# Patient Record
Sex: FEMALE | Race: WHITE | NOT HISPANIC OR LATINO | URBAN - METROPOLITAN AREA
[De-identification: names, ages, dates, MRNs, and addresses within clinical notes are randomized per-mention and may not be internally consistent; named-entity substitution may affect disease eponyms.]

---

## 2018-09-15 ENCOUNTER — EMERGENCY (EMERGENCY)
Facility: HOSPITAL | Age: 78
LOS: 1 days | Discharge: HOME | End: 2018-09-15
Admitting: EMERGENCY MEDICINE
Payer: MEDICARE

## 2018-09-15 VITALS
HEART RATE: 74 BPM | SYSTOLIC BLOOD PRESSURE: 118 MMHG | TEMPERATURE: 98 F | OXYGEN SATURATION: 96 % | RESPIRATION RATE: 20 BRPM | DIASTOLIC BLOOD PRESSURE: 68 MMHG

## 2018-09-15 DIAGNOSIS — H66.92 OTITIS MEDIA, UNSPECIFIED, LEFT EAR: ICD-10-CM

## 2018-09-15 DIAGNOSIS — H92.02 OTALGIA, LEFT EAR: ICD-10-CM

## 2018-09-15 DIAGNOSIS — H60.92 UNSPECIFIED OTITIS EXTERNA, LEFT EAR: ICD-10-CM

## 2018-09-15 DIAGNOSIS — Z79.2 LONG TERM (CURRENT) USE OF ANTIBIOTICS: ICD-10-CM

## 2018-09-15 DIAGNOSIS — Z79.891 LONG TERM (CURRENT) USE OF OPIATE ANALGESIC: ICD-10-CM

## 2018-09-15 PROCEDURE — 99283 EMERGENCY DEPT VISIT LOW MDM: CPT

## 2018-09-15 RX ORDER — CIPROFLOXACIN AND DEXAMETHASONE 3; 1 MG/ML; MG/ML
4 SUSPENSION/ DROPS AURICULAR (OTIC) ONCE
Qty: 0 | Refills: 0 | Status: COMPLETED | OUTPATIENT
Start: 2018-09-15 | End: 2018-09-15

## 2018-09-15 RX ORDER — OXYCODONE AND ACETAMINOPHEN 5; 325 MG/1; MG/1
1 TABLET ORAL ONCE
Qty: 0 | Refills: 0 | Status: DISCONTINUED | OUTPATIENT
Start: 2018-09-15 | End: 2018-09-15

## 2018-09-15 RX ADMIN — OXYCODONE AND ACETAMINOPHEN 1 TABLET(S): 5; 325 TABLET ORAL at 23:33

## 2018-09-15 RX ADMIN — Medication 1 TABLET(S): at 23:33

## 2018-09-15 RX ADMIN — CIPROFLOXACIN AND DEXAMETHASONE 4 DROP(S): 3; 1 SUSPENSION/ DROPS AURICULAR (OTIC) at 23:33

## 2018-09-15 NOTE — ED PROVIDER NOTE - OBJECTIVE STATEMENT
78 yo F with no known PMHx, presenting c/o L ear pain x 2d.  Pt reports having a few days of non productive cough, sinus pressure, and L ear pain, s/p dental work yesterday and noted worsening L ear pain with muffled hearing and intermittent sharp pain radiating to the face.  Denies trauma, tinnitus, change in gait/balance, dysequilibrium, HA, dizziness, fever, chills, FB sensation, cough, sore throat, d/c, bleeding, pruritus, N/V, SOB, CP, palpitations, focal weakness, and malaise.

## 2018-09-15 NOTE — ED PROVIDER NOTE - MEDICAL DECISION MAKING DETAILS
AFVSS at time of d/c, pt non-toxic appearing, results, ddx, and f/u plans discussed with pt at bedside, d/c'd home to f/u with ENT, strict return precautions discussed, prompt return to ER for any worsening or new sx, pt verbalized understanding.

## 2018-09-15 NOTE — ED ADULT NURSE NOTE - CHPI ED NUR SYMPTOMS NEG
no fever/no nausea/no tingling/no weakness/no decreased eating/drinking/no dizziness/no chills/no vomiting

## 2018-09-15 NOTE — ED PROVIDER NOTE - PHYSICAL EXAMINATION
Gen - WDWN, NAD, comfortable and non-toxic appearing  Skin - warm, dry, intact   HEENT - AT/NC, TM intact b/l, L ear with dull retracted TM with slight yellowish discoloration and fluids, slightly erythematous canal, no mastoid or pinna/tragus tenderness to percussion, uvula midline, o/p clear, airway patent, neck supple, no palpable nodes noted   CV - S1S2, R/R/R  Resp - CTAB, no r/r/w  GI - soft, ND, NT, no CVAT b/l   MS - w/w/p, no c/c/e  Neuro - AxOx3, ambulatory without gait disturbance

## 2020-10-21 ENCOUNTER — INPATIENT (INPATIENT)
Facility: HOSPITAL | Age: 80
LOS: 2 days | Discharge: NURSING, REHAB OR LONG-TERM CARE | DRG: 563 | End: 2020-10-24
Attending: STUDENT IN AN ORGANIZED HEALTH CARE EDUCATION/TRAINING PROGRAM | Admitting: STUDENT IN AN ORGANIZED HEALTH CARE EDUCATION/TRAINING PROGRAM
Payer: MEDICARE

## 2020-10-21 VITALS
TEMPERATURE: 98 F | HEART RATE: 85 BPM | OXYGEN SATURATION: 95 % | WEIGHT: 149.91 LBS | RESPIRATION RATE: 18 BRPM | SYSTOLIC BLOOD PRESSURE: 131 MMHG | DIASTOLIC BLOOD PRESSURE: 72 MMHG | HEIGHT: 63 IN

## 2020-10-21 DIAGNOSIS — S82.035A NONDISPLACED TRANSVERSE FRACTURE OF LEFT PATELLA, INITIAL ENCOUNTER FOR CLOSED FRACTURE: ICD-10-CM

## 2020-10-21 DIAGNOSIS — Z98.890 OTHER SPECIFIED POSTPROCEDURAL STATES: Chronic | ICD-10-CM

## 2020-10-21 DIAGNOSIS — Z90.49 ACQUIRED ABSENCE OF OTHER SPECIFIED PARTS OF DIGESTIVE TRACT: Chronic | ICD-10-CM

## 2020-10-21 DIAGNOSIS — B00.9 HERPESVIRAL INFECTION, UNSPECIFIED: ICD-10-CM

## 2020-10-21 DIAGNOSIS — L65.9 NONSCARRING HAIR LOSS, UNSPECIFIED: ICD-10-CM

## 2020-10-21 DIAGNOSIS — F33.8 OTHER RECURRENT DEPRESSIVE DISORDERS: ICD-10-CM

## 2020-10-21 DIAGNOSIS — R73.03 PREDIABETES: ICD-10-CM

## 2020-10-21 DIAGNOSIS — R63.8 OTHER SYMPTOMS AND SIGNS CONCERNING FOOD AND FLUID INTAKE: ICD-10-CM

## 2020-10-21 DIAGNOSIS — Z96.652 PRESENCE OF LEFT ARTIFICIAL KNEE JOINT: Chronic | ICD-10-CM

## 2020-10-21 DIAGNOSIS — I62.9 NONTRAUMATIC INTRACRANIAL HEMORRHAGE, UNSPECIFIED: ICD-10-CM

## 2020-10-21 DIAGNOSIS — N39.0 URINARY TRACT INFECTION, SITE NOT SPECIFIED: ICD-10-CM

## 2020-10-21 DIAGNOSIS — E55.9 VITAMIN D DEFICIENCY, UNSPECIFIED: ICD-10-CM

## 2020-10-21 DIAGNOSIS — E78.5 HYPERLIPIDEMIA, UNSPECIFIED: ICD-10-CM

## 2020-10-21 LAB
ALBUMIN SERPL ELPH-MCNC: 3.9 G/DL — SIGNIFICANT CHANGE UP (ref 3.4–5)
ALP SERPL-CCNC: 57 U/L — SIGNIFICANT CHANGE UP (ref 40–120)
ALT FLD-CCNC: 22 U/L — SIGNIFICANT CHANGE UP (ref 12–42)
ANION GAP SERPL CALC-SCNC: 10 MMOL/L — SIGNIFICANT CHANGE UP (ref 9–16)
APPEARANCE UR: SIGNIFICANT CHANGE UP
APTT BLD: 30.5 SEC — SIGNIFICANT CHANGE UP (ref 27.5–35.5)
AST SERPL-CCNC: 23 U/L — SIGNIFICANT CHANGE UP (ref 15–37)
BACTERIA # UR AUTO: PRESENT /HPF
BASOPHILS # BLD AUTO: 0.08 K/UL — SIGNIFICANT CHANGE UP (ref 0–0.2)
BASOPHILS NFR BLD AUTO: 0.4 % — SIGNIFICANT CHANGE UP (ref 0–2)
BILIRUB SERPL-MCNC: 0.5 MG/DL — SIGNIFICANT CHANGE UP (ref 0.2–1.2)
BILIRUB UR-MCNC: ABNORMAL
BUN SERPL-MCNC: 22 MG/DL — SIGNIFICANT CHANGE UP (ref 7–23)
CALCIUM SERPL-MCNC: 9.4 MG/DL — SIGNIFICANT CHANGE UP (ref 8.5–10.5)
CHLORIDE SERPL-SCNC: 104 MMOL/L — SIGNIFICANT CHANGE UP (ref 96–108)
CO2 SERPL-SCNC: 25 MMOL/L — SIGNIFICANT CHANGE UP (ref 22–31)
COLOR SPEC: YELLOW — SIGNIFICANT CHANGE UP
COMMENT - URINE: SIGNIFICANT CHANGE UP
COMMENT - URINE: SIGNIFICANT CHANGE UP
CREAT SERPL-MCNC: 0.63 MG/DL — SIGNIFICANT CHANGE UP (ref 0.5–1.3)
CRP SERPL-MCNC: 0.2 MG/DL — SIGNIFICANT CHANGE UP (ref 0–0.9)
DIFF PNL FLD: ABNORMAL
EOSINOPHIL # BLD AUTO: 0.01 K/UL — SIGNIFICANT CHANGE UP (ref 0–0.5)
EOSINOPHIL NFR BLD AUTO: 0.1 % — SIGNIFICANT CHANGE UP (ref 0–6)
EPI CELLS # UR: SIGNIFICANT CHANGE UP /HPF (ref 0–5)
GLUCOSE SERPL-MCNC: 134 MG/DL — HIGH (ref 70–99)
GLUCOSE UR QL: NEGATIVE — SIGNIFICANT CHANGE UP
HCT VFR BLD CALC: 38.3 % — SIGNIFICANT CHANGE UP (ref 34.5–45)
HGB BLD-MCNC: 12.8 G/DL — SIGNIFICANT CHANGE UP (ref 11.5–15.5)
IMM GRANULOCYTES NFR BLD AUTO: 0.5 % — SIGNIFICANT CHANGE UP (ref 0–1.5)
INR BLD: 1.09 — SIGNIFICANT CHANGE UP (ref 0.88–1.16)
KETONES UR-MCNC: >=80 MG/DL
LACTATE SERPL-SCNC: 0.8 MMOL/L — SIGNIFICANT CHANGE UP (ref 0.4–2)
LEUKOCYTE ESTERASE UR-ACNC: ABNORMAL
LYMPHOCYTES # BLD AUTO: 15.4 % — SIGNIFICANT CHANGE UP (ref 13–44)
LYMPHOCYTES # BLD AUTO: 2.88 K/UL — SIGNIFICANT CHANGE UP (ref 1–3.3)
MCHC RBC-ENTMCNC: 32.2 PG — SIGNIFICANT CHANGE UP (ref 27–34)
MCHC RBC-ENTMCNC: 33.4 GM/DL — SIGNIFICANT CHANGE UP (ref 32–36)
MCV RBC AUTO: 96.5 FL — SIGNIFICANT CHANGE UP (ref 80–100)
MONOCYTES # BLD AUTO: 1.37 K/UL — HIGH (ref 0–0.9)
MONOCYTES NFR BLD AUTO: 7.3 % — SIGNIFICANT CHANGE UP (ref 2–14)
NEUTROPHILS # BLD AUTO: 14.23 K/UL — HIGH (ref 1.8–7.4)
NEUTROPHILS NFR BLD AUTO: 76.3 % — SIGNIFICANT CHANGE UP (ref 43–77)
NITRITE UR-MCNC: NEGATIVE — SIGNIFICANT CHANGE UP
NRBC # BLD: 0 /100 WBCS — SIGNIFICANT CHANGE UP (ref 0–0)
PH UR: 5.5 — SIGNIFICANT CHANGE UP (ref 5–8)
PLATELET # BLD AUTO: 228 K/UL — SIGNIFICANT CHANGE UP (ref 150–400)
POTASSIUM SERPL-MCNC: 3.8 MMOL/L — SIGNIFICANT CHANGE UP (ref 3.5–5.3)
POTASSIUM SERPL-SCNC: 3.8 MMOL/L — SIGNIFICANT CHANGE UP (ref 3.5–5.3)
PROT SERPL-MCNC: 7.5 G/DL — SIGNIFICANT CHANGE UP (ref 6.4–8.2)
PROT UR-MCNC: NEGATIVE MG/DL — SIGNIFICANT CHANGE UP
PROTHROM AB SERPL-ACNC: 12.8 SEC — SIGNIFICANT CHANGE UP (ref 10.6–13.6)
RBC # BLD: 3.97 M/UL — SIGNIFICANT CHANGE UP (ref 3.8–5.2)
RBC # FLD: 14.7 % — HIGH (ref 10.3–14.5)
RBC CASTS # UR COMP ASSIST: > 10 /HPF
SARS-COV-2 RNA SPEC QL NAA+PROBE: SIGNIFICANT CHANGE UP
SODIUM SERPL-SCNC: 139 MMOL/L — SIGNIFICANT CHANGE UP (ref 132–145)
SP GR SPEC: >=1.03 — SIGNIFICANT CHANGE UP (ref 1–1.03)
UROBILINOGEN FLD QL: 0.2 E.U./DL — SIGNIFICANT CHANGE UP
WBC # BLD: 18.67 K/UL — HIGH (ref 3.8–10.5)
WBC # FLD AUTO: 18.67 K/UL — HIGH (ref 3.8–10.5)
WBC UR QL: ABNORMAL /HPF

## 2020-10-21 PROCEDURE — 73564 X-RAY EXAM KNEE 4 OR MORE: CPT | Mod: 26,LT

## 2020-10-21 PROCEDURE — 71045 X-RAY EXAM CHEST 1 VIEW: CPT | Mod: 26

## 2020-10-21 PROCEDURE — 99223 1ST HOSP IP/OBS HIGH 75: CPT | Mod: AI,GC

## 2020-10-21 PROCEDURE — 73700 CT LOWER EXTREMITY W/O DYE: CPT | Mod: 26,LT

## 2020-10-21 PROCEDURE — 99284 EMERGENCY DEPT VISIT MOD MDM: CPT

## 2020-10-21 RX ORDER — MORPHINE SULFATE 50 MG/1
4 CAPSULE, EXTENDED RELEASE ORAL ONCE
Refills: 0 | Status: DISCONTINUED | OUTPATIENT
Start: 2020-10-21 | End: 2020-10-21

## 2020-10-21 RX ORDER — ACYCLOVIR SODIUM 500 MG
150 VIAL (EA) INTRAVENOUS DAILY
Refills: 0 | Status: DISCONTINUED | OUTPATIENT
Start: 2020-10-21 | End: 2020-10-21

## 2020-10-21 RX ORDER — SPIRONOLACTONE 25 MG/1
25 TABLET, FILM COATED ORAL DAILY
Refills: 0 | Status: DISCONTINUED | OUTPATIENT
Start: 2020-10-21 | End: 2020-10-24

## 2020-10-21 RX ORDER — OXYCODONE AND ACETAMINOPHEN 5; 325 MG/1; MG/1
0.25 TABLET ORAL ONCE
Refills: 0 | Status: DISCONTINUED | OUTPATIENT
Start: 2020-10-21 | End: 2020-10-21

## 2020-10-21 RX ORDER — MORPHINE SULFATE 50 MG/1
2 CAPSULE, EXTENDED RELEASE ORAL ONCE
Refills: 0 | Status: DISCONTINUED | OUTPATIENT
Start: 2020-10-21 | End: 2020-10-21

## 2020-10-21 RX ORDER — OXYCODONE HYDROCHLORIDE 5 MG/1
5 TABLET ORAL EVERY 4 HOURS
Refills: 0 | Status: DISCONTINUED | OUTPATIENT
Start: 2020-10-21 | End: 2020-10-21

## 2020-10-21 RX ORDER — ONDANSETRON 8 MG/1
4 TABLET, FILM COATED ORAL EVERY 8 HOURS
Refills: 0 | Status: DISCONTINUED | OUTPATIENT
Start: 2020-10-21 | End: 2020-10-24

## 2020-10-21 RX ORDER — CYCLOBENZAPRINE HYDROCHLORIDE 10 MG/1
5 TABLET, FILM COATED ORAL THREE TIMES A DAY
Refills: 0 | Status: DISCONTINUED | OUTPATIENT
Start: 2020-10-21 | End: 2020-10-24

## 2020-10-21 RX ORDER — BUPROPION HYDROCHLORIDE 150 MG/1
150 TABLET, EXTENDED RELEASE ORAL DAILY
Refills: 0 | Status: DISCONTINUED | OUTPATIENT
Start: 2020-10-21 | End: 2020-10-24

## 2020-10-21 RX ORDER — ACETAMINOPHEN 500 MG
975 TABLET ORAL ONCE
Refills: 0 | Status: COMPLETED | OUTPATIENT
Start: 2020-10-21 | End: 2020-10-21

## 2020-10-21 RX ORDER — ACYCLOVIR SODIUM 500 MG
150 VIAL (EA) INTRAVENOUS
Qty: 0 | Refills: 0 | DISCHARGE

## 2020-10-21 RX ORDER — ATORVASTATIN CALCIUM 80 MG/1
10 TABLET, FILM COATED ORAL AT BEDTIME
Refills: 0 | Status: DISCONTINUED | OUTPATIENT
Start: 2020-10-21 | End: 2020-10-24

## 2020-10-21 RX ORDER — HYDROMORPHONE HYDROCHLORIDE 2 MG/ML
0.25 INJECTION INTRAMUSCULAR; INTRAVENOUS; SUBCUTANEOUS EVERY 4 HOURS
Refills: 0 | Status: DISCONTINUED | OUTPATIENT
Start: 2020-10-21 | End: 2020-10-24

## 2020-10-21 RX ORDER — CHOLECALCIFEROL (VITAMIN D3) 125 MCG
3 CAPSULE ORAL
Qty: 0 | Refills: 0 | DISCHARGE

## 2020-10-21 RX ORDER — ACYCLOVIR SODIUM 500 MG
400 VIAL (EA) INTRAVENOUS
Refills: 0 | Status: DISCONTINUED | OUTPATIENT
Start: 2020-10-21 | End: 2020-10-24

## 2020-10-21 RX ORDER — ACETAMINOPHEN 500 MG
650 TABLET ORAL EVERY 6 HOURS
Refills: 0 | Status: DISCONTINUED | OUTPATIENT
Start: 2020-10-21 | End: 2020-10-21

## 2020-10-21 RX ORDER — OXYCODONE HYDROCHLORIDE 5 MG/1
2.5 TABLET ORAL EVERY 4 HOURS
Refills: 0 | Status: DISCONTINUED | OUTPATIENT
Start: 2020-10-21 | End: 2020-10-24

## 2020-10-21 RX ORDER — POLYETHYLENE GLYCOL 3350 17 G/17G
17 POWDER, FOR SOLUTION ORAL DAILY
Refills: 0 | Status: DISCONTINUED | OUTPATIENT
Start: 2020-10-21 | End: 2020-10-24

## 2020-10-21 RX ORDER — METFORMIN HYDROCHLORIDE 850 MG/1
500 TABLET ORAL DAILY
Refills: 0 | Status: DISCONTINUED | OUTPATIENT
Start: 2020-10-21 | End: 2020-10-23

## 2020-10-21 RX ORDER — SODIUM CHLORIDE 9 MG/ML
1000 INJECTION INTRAMUSCULAR; INTRAVENOUS; SUBCUTANEOUS ONCE
Refills: 0 | Status: COMPLETED | OUTPATIENT
Start: 2020-10-21 | End: 2020-10-21

## 2020-10-21 RX ORDER — SENNA PLUS 8.6 MG/1
2 TABLET ORAL AT BEDTIME
Refills: 0 | Status: DISCONTINUED | OUTPATIENT
Start: 2020-10-21 | End: 2020-10-24

## 2020-10-21 RX ORDER — CHOLECALCIFEROL (VITAMIN D3) 125 MCG
3000 CAPSULE ORAL DAILY
Refills: 0 | Status: DISCONTINUED | OUTPATIENT
Start: 2020-10-21 | End: 2020-10-24

## 2020-10-21 RX ORDER — HYDROMORPHONE HYDROCHLORIDE 2 MG/ML
0.5 INJECTION INTRAMUSCULAR; INTRAVENOUS; SUBCUTANEOUS EVERY 4 HOURS
Refills: 0 | Status: DISCONTINUED | OUTPATIENT
Start: 2020-10-21 | End: 2020-10-21

## 2020-10-21 RX ORDER — ACETAMINOPHEN 500 MG
650 TABLET ORAL EVERY 8 HOURS
Refills: 0 | Status: DISCONTINUED | OUTPATIENT
Start: 2020-10-21 | End: 2020-10-24

## 2020-10-21 RX ADMIN — MORPHINE SULFATE 2 MILLIGRAM(S): 50 CAPSULE, EXTENDED RELEASE ORAL at 03:30

## 2020-10-21 RX ADMIN — MORPHINE SULFATE 2 MILLIGRAM(S): 50 CAPSULE, EXTENDED RELEASE ORAL at 06:21

## 2020-10-21 RX ADMIN — Medication 650 MILLIGRAM(S): at 22:08

## 2020-10-21 RX ADMIN — HYDROMORPHONE HYDROCHLORIDE 0.25 MILLIGRAM(S): 2 INJECTION INTRAMUSCULAR; INTRAVENOUS; SUBCUTANEOUS at 14:46

## 2020-10-21 RX ADMIN — ATORVASTATIN CALCIUM 10 MILLIGRAM(S): 80 TABLET, FILM COATED ORAL at 22:08

## 2020-10-21 RX ADMIN — MORPHINE SULFATE 2 MILLIGRAM(S): 50 CAPSULE, EXTENDED RELEASE ORAL at 03:14

## 2020-10-21 RX ADMIN — OXYCODONE AND ACETAMINOPHEN 0.25 TABLET(S): 5; 325 TABLET ORAL at 01:44

## 2020-10-21 RX ADMIN — OXYCODONE HYDROCHLORIDE 5 MILLIGRAM(S): 5 TABLET ORAL at 09:39

## 2020-10-21 RX ADMIN — HYDROMORPHONE HYDROCHLORIDE 0.5 MILLIGRAM(S): 2 INJECTION INTRAMUSCULAR; INTRAVENOUS; SUBCUTANEOUS at 10:30

## 2020-10-21 RX ADMIN — OXYCODONE HYDROCHLORIDE 5 MILLIGRAM(S): 5 TABLET ORAL at 08:58

## 2020-10-21 RX ADMIN — OXYCODONE AND ACETAMINOPHEN 0.25 TABLET(S): 5; 325 TABLET ORAL at 02:55

## 2020-10-21 RX ADMIN — Medication 650 MILLIGRAM(S): at 23:00

## 2020-10-21 RX ADMIN — MORPHINE SULFATE 4 MILLIGRAM(S): 50 CAPSULE, EXTENDED RELEASE ORAL at 06:23

## 2020-10-21 RX ADMIN — OXYCODONE AND ACETAMINOPHEN 0.25 TABLET(S): 5; 325 TABLET ORAL at 03:08

## 2020-10-21 RX ADMIN — MORPHINE SULFATE 2 MILLIGRAM(S): 50 CAPSULE, EXTENDED RELEASE ORAL at 03:45

## 2020-10-21 RX ADMIN — SENNA PLUS 2 TABLET(S): 8.6 TABLET ORAL at 22:08

## 2020-10-21 RX ADMIN — HYDROMORPHONE HYDROCHLORIDE 0.25 MILLIGRAM(S): 2 INJECTION INTRAMUSCULAR; INTRAVENOUS; SUBCUTANEOUS at 15:00

## 2020-10-21 RX ADMIN — MORPHINE SULFATE 2 MILLIGRAM(S): 50 CAPSULE, EXTENDED RELEASE ORAL at 04:36

## 2020-10-21 RX ADMIN — BUPROPION HYDROCHLORIDE 150 MILLIGRAM(S): 150 TABLET, EXTENDED RELEASE ORAL at 14:17

## 2020-10-21 RX ADMIN — CYCLOBENZAPRINE HYDROCHLORIDE 5 MILLIGRAM(S): 10 TABLET, FILM COATED ORAL at 11:55

## 2020-10-21 RX ADMIN — HYDROMORPHONE HYDROCHLORIDE 0.5 MILLIGRAM(S): 2 INJECTION INTRAMUSCULAR; INTRAVENOUS; SUBCUTANEOUS at 10:04

## 2020-10-21 RX ADMIN — OXYCODONE AND ACETAMINOPHEN 0.25 TABLET(S): 5; 325 TABLET ORAL at 01:56

## 2020-10-21 RX ADMIN — OXYCODONE AND ACETAMINOPHEN 0.25 TABLET(S): 5; 325 TABLET ORAL at 02:08

## 2020-10-21 RX ADMIN — Medication 650 MILLIGRAM(S): at 14:25

## 2020-10-21 RX ADMIN — SODIUM CHLORIDE 1000 MILLILITER(S): 9 INJECTION INTRAMUSCULAR; INTRAVENOUS; SUBCUTANEOUS at 05:53

## 2020-10-21 RX ADMIN — OXYCODONE AND ACETAMINOPHEN 0.25 TABLET(S): 5; 325 TABLET ORAL at 02:44

## 2020-10-21 RX ADMIN — Medication 650 MILLIGRAM(S): at 14:18

## 2020-10-21 RX ADMIN — ONDANSETRON 4 MILLIGRAM(S): 8 TABLET, FILM COATED ORAL at 14:25

## 2020-10-21 RX ADMIN — MORPHINE SULFATE 4 MILLIGRAM(S): 50 CAPSULE, EXTENDED RELEASE ORAL at 05:52

## 2020-10-21 RX ADMIN — Medication 400 MILLIGRAM(S): at 17:24

## 2020-10-21 RX ADMIN — Medication 975 MILLIGRAM(S): at 06:21

## 2020-10-21 NOTE — H&P ADULT - NSHPSOCIALHISTORY_GEN_ALL_CORE
Pt lives alone, but has children and significant other that live in [place]  Former smoker (quit 30 yrs ago), social EtOH, no illicit drug use
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
## 2020-10-21 NOTE — H&P ADULT - PROBLEM SELECTOR PLAN 5
F: tolerating PO, no IVF  E: replete K<4, Mg<2  N: Regular    VTE Prophylaxis: Lovenox 40mg q24h  GI: not needed  C: Full Code  D: RMF Pt reports last HA1C 5.7%. On home metformin 500mg qd  -c/w metformin 500mg qd

## 2020-10-21 NOTE — H&P ADULT - PROBLEM SELECTOR PLAN 1
CT left knee shows acute nondisplaced transverse fracture of left patella, s/p mechanical fall.   -consult ortho, f/u recs  -consult PT, f/u recs  -Tylenol 650mg PO q6h PRN for mild pain  -Oxycodone IR 5mg PO q6h PRN for moderate pain CT left knee shows acute nondisplaced transverse fracture of left patella, s/p mechanical fall.   -Ortho onboard, f/u recs  -consult PT, f/u recs  -Tylenol 650mg PO q6h PRN for mild pain  -Oxycodone IR 5mg PO q4h PRN for moderate pain  -Dilaudid 0.5mg IV q4h PRN for severe pain CT left knee shows acute nondisplaced transverse fracture of left patella, s/p mechanical fall.   -Ortho onboard, f/u recs  -consult PT, f/u recs  -Tylenol 650mg PO q6h PRN for mild pain  -Oxycodone IR 5mg PO q4h PRN for moderate pain  -Dilaudid 0.5mg IV q4h PRN for severe pain  -Miralax 17g qd and Senna 2 tabs at bedtime for bowel regimen CT left knee shows acute nondisplaced transverse fracture of left patella, s/p mechanical fall.   -Ortho onboard, f/u recs  -PT consulted, f/u recs  -Tylenol 650mg PO q6h PRN for mild pain  -Oxycodone IR 5mg PO q4h PRN for moderate pain  -Dilaudid 0.5mg IV q4h PRN for severe pain  -Miralax 17g qd and Senna 2 tabs at bedtime for bowel regimen  -Ice/Elevation CT left knee shows acute nondisplaced transverse fracture of left patella, s/p mechanical fall.   -Ortho onboard, f/u recs  -PT consulted, f/u recs  -Pain management consulted, f/u recs  -Tylenol 650mg PO q8h standing  -Oxycodone IR 5mg PO q4h PRN for moderate pain  -Dilaudid 0.5mg IV q4h PRN for severe breakthrough pain  -Flexeril IR 5mg PO TID PRN for muscle spasms in left knee  -Miralax 17g qd and Senna 2 tabs at bedtime for bowel regimen  -Ice/Elevation

## 2020-10-21 NOTE — H&P ADULT - PROBLEM SELECTOR PLAN 8
Pt reports hx of oral/buccal herpes. Not currently active. On home acyclovir 150mg qd.  -c/w acyclovir 150mg qd
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
## 2020-10-21 NOTE — H&P ADULT - NSICDXPASTSURGICALHX_GEN_ALL_CORE_FT
PAST SURGICAL HISTORY:  No significant past surgical history      PAST SURGICAL HISTORY:  History of arthroplasty of left knee     History of cholecystectomy     S/P bunionectomy     S/P skin cancer resection Merkel Cell Carcinoma, Left Elbow

## 2020-10-21 NOTE — PROGRESS NOTE ADULT - SUBJECTIVE AND OBJECTIVE BOX
Patient seen and examined while in bed. she is upset with her care saying she needs to go to the bathroom. I asked if she needed any pain control and we found that dilaudid with zofran would be a good combination for her as dilaudid has made her drowsy and caused her to have upset stomach.                          12.8   18.67 )-----------( 228      ( 21 Oct 2020 03:14 )             38.3     10-21    139  |  104  |  22  ----------------------------<  134<H>  3.8   |  25  |  0.63    Ca    9.4      21 Oct 2020 03:14    TPro  7.5  /  Alb  3.9  /  TBili  0.5  /  DBili  x   /  AST  23  /  ALT  22  /  AlkPhos  57  10-21        GENERAL: NAD, lying in bed comfortably  HEAD:  Atraumatic, Normocephalic  EYES: EOMI, conjunctiva and sclera clear  ENT: Moist mucous membranes  NECK: Supple, No JVD  CHEST/LUNG: Clear to auscultation bilaterally; No rales, rhonchi, wheezing, or rubs. Unlabored respirations  HEART: Regular rate and rhythm; No murmurs, rubs, or gallops  ABDOMEN: BSx4; Soft, nontender, nondistended  EXTREMITIES:  2+ Peripheral Pulses, brisk capillary refill. bilateral knee ecchymosis and diffuse swelling of left knee with leg out of immobilization cast   NERVOUS SYSTEM:  A&Ox3, no focal deficits   SKIN: No rashes or lesions
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
## 2020-10-21 NOTE — H&P ADULT - PROBLEM SELECTOR PLAN 4
Pt reports hx of seasonal affective disorder. On home Wellbutrin XL 150mg qd.   -c/w Wellbutrin XL 150mg PO qd
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
## 2020-10-21 NOTE — ED PROVIDER NOTE - MUSCULOSKELETAL, MLM
Spine appears normal, + left knee significantly swollen and tender over the patella, no tenderness to the medial or lateral joint line, unable to extend left knee against gravity but able to flex it to 120 degrees comfortably. Spine appears normal, 2+ dp/pt pulses equal bilat, compartments soft, + left knee significantly swollen and tender over the patella, no tenderness to the medial or lateral joint line, unable to extend left knee against gravity but able to flex it to 120 degrees comfortably.
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
## 2020-10-21 NOTE — ED ADULT NURSE REASSESSMENT NOTE - NS ED NURSE REASSESS COMMENT FT1
Report received from KEVIN Campbell for continuity of care, pain managed, safety and comfort maintained. Will continue to monitor.
Pt laying in bed, continue to c/o L knee pain. Administered additional 0.25 tablet of Percocet. CT scan of knee pending. Will continue to monitor. Side rails up, safety is maintained.

## 2020-10-21 NOTE — H&P ADULT - NSHPREVIEWOFSYSTEMS_GEN_ALL_CORE
Constitutional: No fevers or chills  HEENT: No visual changes;  No vertigo or throat pain. No neck pain or stiffness  Cardio: No chest pain or palpitations  Resp: No cough, wheezing, hemoptysis, SOB  GI: No abdominal or epigastric pain. No nausea, vomiting, or hematemesis; No diarrhea or constipation. No melena or hematochezia.  : No dysuria, frequency or hematuria  Neuro: No numbness or weakness  Skin: No itching, rashes

## 2020-10-21 NOTE — ED ADULT NURSE NOTE - NSIMPLEMENTINTERV_GEN_ALL_ED
Implemented All Fall Risk Interventions:  [place] to call system. Call bell, personal items and telephone within reach. Instruct patient to call for assistance. Room bathroom lighting operational. Non-slip footwear when patient is off stretcher. Physically safe environment: no spills, clutter or unnecessary equipment. Stretcher in lowest position, wheels locked, appropriate side rails in place. Provide visual cue, wrist band, yellow gown, etc. Monitor gait and stability. Monitor for mental status changes and reorient to person, place, and time. Review medications for side effects contributing to fall risk. Reinforce activity limits and safety measures with patient and family.
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
## 2020-10-21 NOTE — H&P ADULT - NSHPLABSRESULTS_GEN_ALL_CORE
LABS:                         12.8   18.67 )-----------( 228      ( 21 Oct 2020 03:14 )             38.3     10-21    139  |  104  |  22  ----------------------------<  134<H>  3.8   |  25  |  0.63    Ca    9.4      21 Oct 2020 03:14    TPro  7.5  /  Alb  3.9  /  TBili  0.5  /  DBili  x   /  AST  23  /  ALT  22  /  AlkPhos  57  10-21    PT/INR - ( 21 Oct 2020 03:14 )   PT: 12.8 sec;   INR: 1.09          PTT - ( 21 Oct 2020 03:14 )  PTT:30.5 sec  Urinalysis Basic - ( 21 Oct 2020 05:29 )    Color: Yellow / Appearance: SL CLOUDY / SG: >=1.030 / pH: x  Gluc: x / Ketone: >=80 mg/dL  / Bili: Small / Urobili: 0.2 E.U./dL   Blood: x / Protein: NEGATIVE mg/dL / Nitrite: NEGATIVE   Leuk Esterase: Moderate / RBC: > 10 /HPF / WBC Many /HPF   Sq Epi: x / Non Sq Epi: 0-5 /HPF / Bacteria: Present /HPF

## 2020-10-21 NOTE — H&P ADULT - ASSESSMENT
80 yo F w/ a PMHx of HLD, seasonal affective disorder, ICH, pre-DM, and on spironolactone for hair growth, p/w left knee pain after a mechanical fall. Acute nondisplaced transverse fracture of left patella on CT knee. Admitted to [facility] for further management of care.
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
## 2020-10-21 NOTE — ED PROVIDER NOTE - PROGRESS NOTE DETAILS
discussed case with Dr. Oden. unable to get ortho. CTC will continue to attempt ortho consult and will re-eval in 30 minutes. no call back from ortho. called CTC. spoke with Dr. Elizondo who reviewed the imaging and confirms that this is not operative. requests admission to medicine with ortho consult.

## 2020-10-21 NOTE — H&P ADULT - PROBLEM SELECTOR PLAN 9
F: tolerating PO, no IVF  E: replete K<4, Mg<2  N: Regular, Kosher    VTE Prophylaxis: Lovenox 40mg q24h indicated, but holding due to patient refusing pharmacologic DVT prophylaxis because of PMHx of ICH.   GI: not needed  C: Full Code  D: RMF UA positive for UTI. Pt denies suprapubic tenderness, dysuria, urinary frequency.   -No acute intervention indicated at this time as pt asymptomatic

## 2020-10-21 NOTE — ED PROVIDER NOTE - PMH
Hyperlipidemia, unspecified hyperlipidemia type    No pertinent past medical history    Seasonal affective disorder

## 2020-10-21 NOTE — ED PROVIDER NOTE - CARE PLAN
Principal Discharge DX:	Closed nondisplaced transverse fracture of left patella, initial encounter

## 2020-10-21 NOTE — H&P ADULT - NSHPPHYSICALEXAM_GEN_ALL_CORE
Gen: NAD, laying in bed, well appearing, alert, interactive  HEENT: PERRL, anicteric sclera, no JVD, no thyromegaly  Cardio: +S1/S2, RRR, no murmurs  Resp: CTA b/l, no w/r/r  GI: +BS x4, NT/ND  Ext: Left knee swollen and tender to palpation over patella. no tenderness to medial or lateral join line. unable to extend alen against gravity but able to flex to 120 degrees comfortably. no peripheral edema  Vasc: 3+ peripheral pulses  Skin: warm, dry, and intact. no rashes, wounds or scars  Neuro: AAOx3, CN II-XII intact, no focal deficits Gen: NAD, laying in bed, well appearing, alert, interactive  HEENT: PERRL, anicteric sclera, no JVD, no thyromegaly  Cardio: +S1/S2, RRR, no murmurs  Resp: CTA b/l, no w/r/r  GI: +BS x4, NT/ND  Ext: moderate ecchymosis over anterior left knee, skin intact, moderate/large effusion noted. Unable to flex/extend knee passively/actively 2/2 pain; difficult to assess integrity of extensor mechanism. no peripheral edema  Vasc: 3+ peripheral pulses  Skin: warm, dry, and intact. no rashes, wounds or scars  Neuro: AAOx3, CN II-XII intact, no focal deficits. Sensation intact b/l LE

## 2020-10-21 NOTE — H&P ADULT - PROBLEM SELECTOR PLAN 10
F: tolerating PO, no IVF  E: replete K<4, Mg<2  N: Regular, Kosher    VTE Prophylaxis: Lovenox 40mg q24h indicated, but holding due to patient refusing pharmacologic DVT prophylaxis because of PMHx of ICH.   GI: not needed  C: Full Code  D: RMF

## 2020-10-21 NOTE — H&P ADULT - PROBLEM SELECTOR PLAN 3
Pt reports hx of HLD. On home pravastatin 40mg qd at bedtime.   -atorvastatin 10mg qd at bedtime (therapeutic interchange)

## 2020-10-21 NOTE — ED PROVIDER NOTE - SKIN, MLM
Skin normal color for race, warm, dry and intact. No evidence of rash. healing bruise to right forearm.
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
## 2020-10-21 NOTE — ED PROVIDER NOTE - ATTENDING CONTRIBUTION TO CARE
78 yo female s/p mechanical fall sustaining injury to left knee, inability to ambulate secondary to left knee pain    exam: TTP left patella, mild effusion, no tense hemarthrosis, distal NVI.    xray: + patellar fx    plan: knee immob/crutches, pt unable to ambulate on crutches, will discuss with ortho. Will need admission for PT/SS, possible STR.
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
## 2020-10-22 DIAGNOSIS — Z86.79 PERSONAL HISTORY OF OTHER DISEASES OF THE CIRCULATORY SYSTEM: ICD-10-CM

## 2020-10-22 LAB
ANION GAP SERPL CALC-SCNC: 13 MMOL/L — SIGNIFICANT CHANGE UP (ref 5–17)
BUN SERPL-MCNC: 20 MG/DL — SIGNIFICANT CHANGE UP (ref 7–23)
CALCIUM SERPL-MCNC: 9 MG/DL — SIGNIFICANT CHANGE UP (ref 8.4–10.5)
CHLORIDE SERPL-SCNC: 100 MMOL/L — SIGNIFICANT CHANGE UP (ref 96–108)
CO2 SERPL-SCNC: 21 MMOL/L — LOW (ref 22–31)
CREAT SERPL-MCNC: 0.62 MG/DL — SIGNIFICANT CHANGE UP (ref 0.5–1.3)
CULTURE RESULTS: SIGNIFICANT CHANGE UP
GLUCOSE SERPL-MCNC: 152 MG/DL — HIGH (ref 70–99)
HCT VFR BLD CALC: 37.4 % — SIGNIFICANT CHANGE UP (ref 34.5–45)
HGB BLD-MCNC: 12.6 G/DL — SIGNIFICANT CHANGE UP (ref 11.5–15.5)
MAGNESIUM SERPL-MCNC: 1.9 MG/DL — SIGNIFICANT CHANGE UP (ref 1.6–2.6)
MCHC RBC-ENTMCNC: 32 PG — SIGNIFICANT CHANGE UP (ref 27–34)
MCHC RBC-ENTMCNC: 33.7 GM/DL — SIGNIFICANT CHANGE UP (ref 32–36)
MCV RBC AUTO: 94.9 FL — SIGNIFICANT CHANGE UP (ref 80–100)
NRBC # BLD: 0 /100 WBCS — SIGNIFICANT CHANGE UP (ref 0–0)
PHOSPHATE SERPL-MCNC: 2.2 MG/DL — LOW (ref 2.5–4.5)
PLATELET # BLD AUTO: 210 K/UL — SIGNIFICANT CHANGE UP (ref 150–400)
POTASSIUM SERPL-MCNC: 3.8 MMOL/L — SIGNIFICANT CHANGE UP (ref 3.5–5.3)
POTASSIUM SERPL-SCNC: 3.8 MMOL/L — SIGNIFICANT CHANGE UP (ref 3.5–5.3)
RBC # BLD: 3.94 M/UL — SIGNIFICANT CHANGE UP (ref 3.8–5.2)
RBC # FLD: 14.6 % — HIGH (ref 10.3–14.5)
SODIUM SERPL-SCNC: 134 MMOL/L — LOW (ref 135–145)
SPECIMEN SOURCE: SIGNIFICANT CHANGE UP
WBC # BLD: 23.61 K/UL — HIGH (ref 3.8–10.5)
WBC # FLD AUTO: 23.61 K/UL — HIGH (ref 3.8–10.5)

## 2020-10-22 PROCEDURE — 99233 SBSQ HOSP IP/OBS HIGH 50: CPT | Mod: GC

## 2020-10-22 RX ORDER — METFORMIN HYDROCHLORIDE 850 MG/1
1 TABLET ORAL
Qty: 0 | Refills: 0 | DISCHARGE

## 2020-10-22 RX ORDER — CHOLECALCIFEROL (VITAMIN D3) 125 MCG
3 CAPSULE ORAL
Qty: 0 | Refills: 0 | DISCHARGE

## 2020-10-22 RX ORDER — ACETAMINOPHEN 500 MG
2 TABLET ORAL
Qty: 0 | Refills: 0 | DISCHARGE
Start: 2020-10-22

## 2020-10-22 RX ORDER — OXYCODONE HYDROCHLORIDE 5 MG/1
2.5 TABLET ORAL
Qty: 0 | Refills: 0 | DISCHARGE
Start: 2020-10-22

## 2020-10-22 RX ORDER — BUPROPION HYDROCHLORIDE 150 MG/1
1 TABLET, EXTENDED RELEASE ORAL
Qty: 0 | Refills: 0 | DISCHARGE

## 2020-10-22 RX ORDER — SPIRONOLACTONE 25 MG/1
1 TABLET, FILM COATED ORAL
Qty: 0 | Refills: 0 | DISCHARGE

## 2020-10-22 RX ORDER — ACYCLOVIR SODIUM 500 MG
400 VIAL (EA) INTRAVENOUS
Qty: 0 | Refills: 0 | DISCHARGE

## 2020-10-22 RX ADMIN — POLYETHYLENE GLYCOL 3350 17 GRAM(S): 17 POWDER, FOR SOLUTION ORAL at 12:04

## 2020-10-22 RX ADMIN — CYCLOBENZAPRINE HYDROCHLORIDE 5 MILLIGRAM(S): 10 TABLET, FILM COATED ORAL at 00:56

## 2020-10-22 RX ADMIN — OXYCODONE HYDROCHLORIDE 2.5 MILLIGRAM(S): 5 TABLET ORAL at 01:50

## 2020-10-22 RX ADMIN — HYDROMORPHONE HYDROCHLORIDE 0.25 MILLIGRAM(S): 2 INJECTION INTRAMUSCULAR; INTRAVENOUS; SUBCUTANEOUS at 04:45

## 2020-10-22 RX ADMIN — Medication 650 MILLIGRAM(S): at 16:07

## 2020-10-22 RX ADMIN — SPIRONOLACTONE 25 MILLIGRAM(S): 25 TABLET, FILM COATED ORAL at 06:54

## 2020-10-22 RX ADMIN — Medication 3000 UNIT(S): at 12:04

## 2020-10-22 RX ADMIN — BUPROPION HYDROCHLORIDE 150 MILLIGRAM(S): 150 TABLET, EXTENDED RELEASE ORAL at 12:04

## 2020-10-22 RX ADMIN — SENNA PLUS 2 TABLET(S): 8.6 TABLET ORAL at 22:23

## 2020-10-22 RX ADMIN — Medication 650 MILLIGRAM(S): at 06:53

## 2020-10-22 RX ADMIN — Medication 650 MILLIGRAM(S): at 07:50

## 2020-10-22 RX ADMIN — ATORVASTATIN CALCIUM 10 MILLIGRAM(S): 80 TABLET, FILM COATED ORAL at 22:23

## 2020-10-22 RX ADMIN — Medication 400 MILLIGRAM(S): at 06:54

## 2020-10-22 RX ADMIN — HYDROMORPHONE HYDROCHLORIDE 0.25 MILLIGRAM(S): 2 INJECTION INTRAMUSCULAR; INTRAVENOUS; SUBCUTANEOUS at 04:27

## 2020-10-22 RX ADMIN — Medication 400 MILLIGRAM(S): at 17:28

## 2020-10-22 RX ADMIN — Medication 650 MILLIGRAM(S): at 17:27

## 2020-10-22 RX ADMIN — OXYCODONE HYDROCHLORIDE 2.5 MILLIGRAM(S): 5 TABLET ORAL at 00:56

## 2020-10-22 RX ADMIN — METFORMIN HYDROCHLORIDE 500 MILLIGRAM(S): 850 TABLET ORAL at 12:04

## 2020-10-22 RX ADMIN — Medication 650 MILLIGRAM(S): at 22:22

## 2020-10-22 RX ADMIN — Medication 650 MILLIGRAM(S): at 23:20

## 2020-10-22 NOTE — PHYSICAL THERAPY INITIAL EVALUATION ADULT - CRITERIA FOR SKILLED THERAPEUTIC INTERVENTIONS
therapy frequency/anticipated discharge recommendation/functional limitations in following categories/impairments found/rehab potential

## 2020-10-22 NOTE — PHYSICAL THERAPY INITIAL EVALUATION ADULT - BED MOBILITY LIMITATIONS, REHAB EVAL
Normal vision: sees adequately in most situations; can see medication labels, newsprint decreased ability to use legs for bridging/pushing

## 2020-10-22 NOTE — PHYSICAL THERAPY INITIAL EVALUATION ADULT - RANGE OF MOTION EXAMINATION, REHAB EVAL
Right LE ROM was WFL (within functional limits)/LLE in knee immobilizer/deficits as listed below/bilateral upper extremity ROM was WNL (within normal limits)

## 2020-10-22 NOTE — PROGRESS NOTE ADULT - SUBJECTIVE AND OBJECTIVE BOX
Patient seen and examined while in bed. she says she is feeling good overall, but wanted us to get her a head MRI before anticoagulation. After some discussion we came to the fact that noo anticoagulation would be needed and she would need sars. She is ok with sars after we talked. She denied headache, chest pain, sob, abdominal pain. Her pain is better controlled.                                     12.6   23.61 )-----------( 210      ( 22 Oct 2020 11:04 )             37.4     10-22    134<L>  |  100  |  20  ----------------------------<  152<H>  3.8   |  21<L>  |  0.62    Ca    9.0      22 Oct 2020 11:04  Phos  2.2     10-22  Mg     1.9     10-22          GENERAL: NAD, lying in bed comfortably  HEAD:  Atraumatic, Normocephalic  EYES: EOMI, conjunctiva and sclera clear  ENT: Moist mucous membranes  NECK: Supple, No JVD  CHEST/LUNG: Clear to auscultation bilaterally; No rales, rhonchi, wheezing, or rubs. Unlabored respirations  HEART: Regular rate and rhythm; No murmurs, rubs, or gallops  ABDOMEN: BSx4; Soft, nontender, nondistended  EXTREMITIES:  2+ Peripheral Pulses, brisk capillary refill. bilateral knee ecchymosis and diffuse swelling of left knee with leg out of immobilization cast   NERVOUS SYSTEM:  A&Ox3, no focal deficits   SKIN: No rashes or lesions

## 2020-10-22 NOTE — PROGRESS NOTE ADULT - SUBJECTIVE AND OBJECTIVE BOX
Pain Management Progress Note - [facility] [phone]      HPI: Patient seen and examined today. As per H and P, 80 yo F w/ a PMHx of ICH, merkel cell carcinoma (s/p skin resection), HLD, seasonal affective disorder, pre-DM, and on spironolactone for hair growth, p/w left knee pain after a mechanical fall. Patient reports LLE pain worse with activity and movement, pain controlled with current pain medication regimen. Patient Axox3, denies any other secondary side effects from opiate use. Reviewed pain medication regimen with patient at bedside. Patient LLE in an immobilizer patient reports sensation to her toes, patient able to wiggle her toes.       Pertinent PMH: Pain at: ___Back ___Neck___Knee ___Hip ___Shoulder ___ Opioid tolerance    Pain is _x__ sharp ____dull ___burning __x_achy ___ Intensity: ____ mild __x__mod ____severe     Location __x___surgical site _____cervical _____lumbar ____abd _____upper ext___x_Left lower ext    Worse with _x___activity _x___movement _____physical therapy___ Rest    Improved with _x___medication __x__rest ____physical therapy      oxyCODONE    IR  HYDROmorphone  Injectable  ondansetron Injectable  acetaminophen   Tablet ..  cyclobenzaprine  acyclovir   Oral Tab/Cap  senna  polyethylene glycol 3350  cholecalciferol  metFORMIN  atorvastatin  HYDROmorphone  Injectable  oxyCODONE    IR  buPROPion XL .  acyclovir    Suspension  spironolactone  oxyCODONE    IR  acetaminophen   Tablet ..  acetaminophen   Tablet ..  morphine  - Injectable  sodium chloride 0.9% Bolus  morphine  - Injectable  morphine  - Injectable  morphine  - Injectable  oxycodone    5 mG/acetaminophen 325 mG  oxycodone    5 mG/acetaminophen 325 mG  oxycodone    5 mG/acetaminophen 325 mG      ROS: Const:  _-__febrile   Eyes:___ENT:___CV: __-_chest pain  Resp: __-__sob  GI:__-_nausea -___vomiting __-__abd pain ___npo ___clears _x__full diet __bm  :___ Musk: _x__pain ___spasm  Skin:___ Neuro:  _-__kczqinch_-__pvicyvwly__-__ numbness _x__weakness _x__paresthesia  Psych:_-__anxiety  Endo:___ Heme:___Allergy:___      10-22 @ 11:0486 mL/min/1.73M2      Hemoglobin: 12.6 g/dL (10-22 @ 11:04)  Hemoglobin: 12.8 g/dL (10-21 @ 03:14)        T(C): 36.7 (10-22-20 @ 10:11), Max: 36.7 (10-22-20 @ 10:11)  HR: 88 (10-22-20 @ 10:11) (85 - 94)  BP: 142/66 (10-22-20 @ 10:11) (104/- - 142/66)  RR: 18 (10-22-20 @ 10:11) (16 - 18)  SpO2: 95% (10-22-20 @ 10:11) (93% - 95%)  Wt(kg): --      PHYSICAL EXAM:  Gen Appearance: _x__no acute distress __x_appropriate       Neuro: _x__SILT feet____ EOM Intact Psych: AAOX_3_, _x__mood/affect appropriate        Eyes: _x__conjunctiva WNL  __x___ Pupils equal and round        ENT: _x__ears and nose atraumatic__x_ Hearing grossly intact        Neck: x___trachea midline, no visible masses ___thyroid without palpable mass    Resp: _x__Nml WOB____No tactile fremitus ___clear to auscultation    Cardio: __x_extremities free from edema __x__pedal pulses palpable    GI/Abdomen: _x__soft __x___ Nontender__x____Nondistended_____HSM    Lymphatic: ___no palpable nodes in neck  ___no palpable nodes calves and feet    Skin/Wound: ___Incision, ___Dressing c/d/i,   ____surrounding tissues soft,  ___drain/chest tube present____    Muscular: EHL _4__/5  Gastrocnemius_4__/5    ___absent clubbing/cyanosis           ASSESSMENT: Patient  H and P, 80 yo F w/ a PMHx of ICH, merkel cell carcinoma (s/p skin resection), HLD, seasonal affective disorder, pre-DM, and on spironolactone for hair growth, p/w left knee pain after a mechanical fall, pain controlled with current pain medication regimen      Recommended Treatment PLAN:  1. Dilaudid 1-2mg PO Q4h prn moderate to severe pain  2. Dilaudid 0.25mg Q4h IVP prn breakthrough pain  3. Flexeril 5mg PO Q8h prn muscle spasms  4. tylenol 975mg PO Q8h   Plan discussed with Dr. Vaz

## 2020-10-22 NOTE — DISCHARGE NOTE PROVIDER - NSDCMRMEDTOKEN_GEN_ALL_CORE_FT
acyclovir: 400 milligram(s) orally 2 times a day  buPROPion 150 mg/24 hours (XL) oral tablet, extended release: 1 tab(s) orally every 24 hours  metFORMIN 500 mg oral tablet: 1 tab(s) orally 2 times a day  pravastatin 40 mg oral tablet: 1 tab(s) orally once a day  spironolactone 25 mg oral tablet: 1 tab(s) orally once a day  Vitamin D3 1000 intl units (25 mcg) oral capsule: 3 cap(s) orally once a day   acetaminophen 325 mg oral tablet: 2 tab(s) orally every 8 hours  acyclovir: 400 milligram(s) orally 2 times a day  buPROPion 150 mg/24 hours (XL) oral tablet, extended release: 1 tab(s) orally every 24 hours  metFORMIN 500 mg oral tablet: 1 tab(s) orally 2 times a day  oxyCODONE: 2.5 milligram(s) orally every 4 minutes, As Needed for pain  pravastatin 40 mg oral tablet: 1 tab(s) orally once a day  spironolactone 25 mg oral tablet: 1 tab(s) orally once a day  Vitamin D3 1000 intl units (25 mcg) oral capsule: 3 cap(s) orally once a day

## 2020-10-22 NOTE — CONSULT NOTE ADULT - ASSESSMENT
per Internal Medicine    78 yo F w/ a PMHx of HLD, seasonal affective disorder, ICH, pre-DM, and on spironolactone for hair growth, p/w left knee pain after a mechanical fall. Acute nondisplaced transverse fracture of left patella on CT knee. Admitted to [facility] for further management of care.    Problem/Plan - 1:  ·  Problem: Closed nondisplaced transverse fracture of left patella, initial encounter.  Plan: CT left knee shows acute nondisplaced transverse fracture of left patella, s/p mechanical fall.   -Ortho onboard, f/u recs  -PT consulted, f/u recs  -Pain management consulted, f/u recs  -Tylenol 650mg PO q8h standing  -Oxycodone IR 5mg PO q4h PRN for moderate pain  -Dilaudid 0.5mg IV q4h PRN for severe breakthrough pain  -Flexeril IR 5mg PO TID PRN for muscle spasms in left knee  -Miralax 17g qd and Senna 2 tabs at bedtime for bowel regimen  -Ice/Elevation.     Problem/Plan - 2:  ·  Problem: Intracranial hemorrhage.  Plan: Pt reports ICH 3-4 years ago. Mild speech irregularity. Pt refusing pharmacologic DVT prophylaxis and NSAID use for concern of rebleed. Pt reports her internist (Dr. Mayco Harper) requests MRI head prior to AC use to ensure no bleed.  -Pharmacologic DVT prophylaxis indicated, but pt refusing at this time because of PMHx of ICH  -contact pt's internist for collateral.     Problem/Plan - 3:  ·  Problem: Hyperlipidemia, unspecified hyperlipidemia type.  Plan: Pt reports hx of HLD. On home pravastatin 40mg qd at bedtime.   -atorvastatin 10mg qd at bedtime (therapeutic interchange).     Problem/Plan - 4:  ·  Problem: Seasonal affective disorder.  Plan: Pt reports hx of seasonal affective disorder. On home Wellbutrin XL 150mg qd.   -c/w Wellbutrin XL 150mg PO qd.     Problem/Plan - 5:  ·  Problem: Pre-diabetes.  Plan: Pt reports last HA1C 5.7%. On home metformin 500mg qd  -c/w metformin 500mg qd.     Problem/Plan - 6:  Problem: Hair loss. Plan: Pt taking home spironolactone 25mg qd for hair growth.  -c/w spironolactone 25mg qd.    Problem/Plan - 7:  ·  Problem: Vitamin D deficiency.  Plan: Pt reports hx of Vit D deficiency. On home Vit D3 3000 units qd  -c/w Vit D3 3000 units qd.     Problem/Plan - 8:  ·  Problem: Oral herpes simplex, not currently active.  Plan: Pt reports hx of oral/buccal herpes. Not currently active. On home acyclovir 150mg qd.  -c/w acyclovir 150mg qd.     Problem/Plan - 9:  ·  Problem: UTI (urinary tract infection).  Plan: UA positive for UTI. Pt denies suprapubic tenderness, dysuria, urinary frequency.   -No acute intervention indicated at this time as pt asymptomatic.     Problem/Plan - 10:  Problem: Nutrition, metabolism, and development symptoms. Plan; F: tolerating PO, no IVF  E: replete K<4, Mg<2  N: Regular, Kosher    VTE Prophylaxis: Lovenox 40mg q24h indicated, but holding due to patient refusing pharmacologic DVT prophylaxis because of PMHx of ICH.   GI: not needed  C: Full Code  D: RMF.

## 2020-10-22 NOTE — DISCHARGE NOTE PROVIDER - HOSPITAL COURSE
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 Found to have left knee patellar fracture. Ortho recs non-operative management. Patient has remote history of spontaneous ICH so decision to use AC was discussed with the patient. She then refused AC without a head MRI. 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 Found to have left knee patellar fracture. Ortho recs non-operative management. Patient has remote history of spontaneous ICH so decision to use AC was discussed with the patient, ultimately it was decided that _____. 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 Found to have left knee patellar fracture. Ortho recs non-operative management. Patient has remote history of spontaneous ICH so decision to use AC was discussed with the patient, ultimately it was decided that patient does not need AC and her remote history of a brain bleed is still a realtive contraindication. 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 Found to have left knee patellar fracture. Ortho recs non-operative management. Patient has remote history of spontaneous ICH so decision to use AC was discussed with the patient, ultimately it was decided that patient does not need AC and her remote history of a brain bleed is still a realtive contraindication. and patient should be discarged to [facility]    Problem/Plan:  Patellar fracture: Non-operative management c/w pain control  Seasonal affective disorder: Wellbutrin  Hair loss: spironolactone  Prediabetic: Metformin  Constipation: cholecaliferol, senna    New meds:  pain management meds    New images to follow-up:  None

## 2020-10-22 NOTE — DISCHARGE NOTE PROVIDER - NSDCFUADDAPPT_GEN_ALL_CORE_FT
You should follow-up with the orthopedist Dr. Clark next week Please follow up with your Orthopaedic Surgery Provider, Dr. Andrea Clark at [address], [address], [place] [address]. on 10/29/2020 at 1:00pm.    Please call Dr. Clark prior to appointment confirm your primary insurance.    Appointment was scheduled by Ms. YEIMI Mcmullen, Referral Coordinator.

## 2020-10-22 NOTE — PROGRESS NOTE ADULT - ASSESSMENT
78 yo F w/ a PMHx of HLD, seasonal affective disorder, ICH, pre-DM, and on spironolactone for hair growth, p/w left knee pain after a mechanical fall. Acute nondisplaced transverse fracture of left patella on CT knee. Admitted to [facility] for further management of care.     Problem/Plan - 1: Closed nondisplaced transverse fracture of left patella, initial encounter  -s/p mechanical fall.   -Ortho onboard, f/u recs  -PT consulted, f/u recs  -Pain management consulted, f/u recs    Problem/Plan - 2: Pain managment  -Tylenol 650mg PO q8h standing  -Oxycodone IR 2.5mg PO q4h PRN for moderate pain  -Dilaudid 0.25mg IV q4h PRN for severe breakthrough pain  -Flexeril IR 5mg PO TID PRN for muscle spasms in left knee  -Ice/Elevation.      Problem/Plan - 3: History of Intracranial hemorrhage  - Pt reports ICH 3-4 years ago  - Pt refusing pharmacologic DVT prophylaxis and NSAID use for concern of rebleed  - Pharmacologic DVT prophylaxis indicated, but pt refusing at this time because of PMHx of ICH  - contact pt's internist for collateral.      Problem/Plan - 3: Hyperlipidemia: home pravastatin 40mg qd at bedtime.   -atorvastatin 10mg qd at bedtime (therapeutic interchange).      Problem/Plan - 4: Seasonal affective disorder  -c/w home Wellbutrin XL 150mg PO qd.      Problem/Plan - 5: Pre-diabetes.  Plan: Pt reports last HA1C 5.7%. On home metformin 500mg qd  -c/w metformin 500mg qd.      Problem/Plan - 6: Hair loss  -c/w home spironolactone 25mg qd.     Problem/Plan - 7: Vitamin D deficiency  -c/w home Vit D3 3000 units qd.      Problem/Plan - 8: Oral herpes simplex, not currently active  -c/w home acyclovir 150mg qd.      Problem/Plan - 9: UTI (urinary tract infection): asymptomatic  -UA positive for UTI. Pt denies suprapubic tenderness, dysuria, urinary frequency so will not treat     Problem/Plan - 10: constipation  -Miralax 17g qd and Senna 2 tabs at bedtime for bowel regimen     Problem/Plan - 11:  Problem: Nutrition, metabolism, and development symptoms. Plan; F: tolerating PO, no IVF  E: replete K<4, Mg<2  N: Regular, Kosher    VTE Prophylaxis: Lovenox 40mg q24h indicated, but holding due to patient refusing pharmacologic DVT prophylaxis because of PMHx of ICH.   GI: not needed  C: Full Code  D: RMF.   78 yo F w/ a PMHx of HLD, seasonal affective disorder, ICH, pre-DM, and on spironolactone for hair growth, p/w left knee pain after a mechanical fall. Acute nondisplaced transverse fracture of left patella on CT knee. Admitted to [facility] for further management of care.     Problem/Plan - 1: Closed nondisplaced transverse fracture of left patella, initial encounter  -s/p mechanical fall.   -Ortho onboard, f/u recs  -PT consulted, f/u recs  -Pain management consulted, f/u recs    Problem/Plan - 2: Pain managment  -Tylenol 650mg PO q8h standing  -Oxycodone IR 2.5mg PO q4h PRN for moderate pain  -Dilaudid 0.25mg IV q4h PRN for severe breakthrough pain  -Flexeril IR 5mg PO TID PRN for muscle spasms in left knee  -Ice/Elevation.      Problem/Plan - 3: History of Intracranial hemorrhage  - Pt reports ICH 3-4 years ago  - Pt refusing pharmacologic DVT prophylaxis and NSAID use for concern of rebleed  - Pharmacologic DVT prophylaxis not indicated right now given type of injury and previous brain bleed  - contact pt's internist for collateral.      Problem/Plan - 3: Hyperlipidemia: home pravastatin 40mg qd at bedtime.   -atorvastatin 10mg qd at bedtime (therapeutic interchange).      Problem/Plan - 4: Seasonal affective disorder  -c/w home Wellbutrin XL 150mg PO qd.      Problem/Plan - 5: Pre-diabetes.  Plan: Pt reports last HA1C 5.7%. On home metformin 500mg qd  -c/w metformin 500mg qd.      Problem/Plan - 6: Hair loss  -c/w home spironolactone 25mg qd.     Problem/Plan - 7: Vitamin D deficiency  -c/w home Vit D3 3000 units qd.      Problem/Plan - 8: Oral herpes simplex, not currently active  -c/w home acyclovir 150mg qd.      Problem/Plan - 9: UTI (urinary tract infection): asymptomatic  -UA positive for UTI. Pt denies suprapubic tenderness, dysuria, urinary frequency so will not treat     Problem/Plan - 10: constipation  -Miralax 17g qd and Senna 2 tabs at bedtime for bowel regimen     Problem/Plan - 11:  Problem: Nutrition, metabolism, and development symptoms. Plan; F: tolerating PO, no IVF  E: replete K<4, Mg<2  N: Regular, Kosher    VTE Prophylaxis: Lovenox 40mg q24h indicated, but holding due to patient refusing pharmacologic DVT prophylaxis because of PMHx of ICH.   GI: not needed  C: Full Code  D: RMF.

## 2020-10-22 NOTE — DISCHARGE NOTE PROVIDER - NSDCCPCAREPLAN_GEN_ALL_CORE_FT
PRINCIPAL DISCHARGE DIAGNOSIS  Diagnosis: Closed nondisplaced transverse fracture of left patella, initial encounter  Assessment and Plan of Treatment: You fractured your patella. It was decided that we manage you without an operation and just use a knee stabilizing cast. You will be prescirbed a pain managemnt regimine that you can take at home. You should follow-up with the orthopedist Dr. Clark next week      SECONDARY DISCHARGE DIAGNOSES  Diagnosis: Intracranial hemorrhage  Assessment and Plan of Treatment: Intracranial hemorrhage in your past has complicated the need for anticoagulation. It was decided    Diagnosis: Seasonal affective disorder  Assessment and Plan of Treatment: Seasonal affective disorder Is when you feel down/depressed during certain times of the year. You should continue your at home wellbutrin    Diagnosis: Oral herpes simplex, not currently active  Assessment and Plan of Treatment: Oral herpes simplex, not currently active. you were maintained on your at home acyclovir    Diagnosis: Pre-diabetes  Assessment and Plan of Treatment: Pre-diabetes means you have elevated HbA1c. You were given your at home metformin while in the hosptial

## 2020-10-22 NOTE — DISCHARGE NOTE PROVIDER - PROVIDER TOKENS
PROVIDER:[TOKEN:[[id number]],FOLLOWUP:[1 week]] FREE:[LAST:[Eduardo],FIRST:[Timomg],PHONE:[phone]],FAX:[phone]],ADDRESS:[ORTHOPAEDIC SURGERY  [address] [place] [address], [address] [place] [address]],SCHEDULEDAPPT:[10/29/2020],SCHEDULEDAPPTTIME:[01:00 PM]]

## 2020-10-22 NOTE — PROGRESS NOTE ADULT - SUBJECTIVE AND OBJECTIVE BOX
Ortho Progress Note    Pt comfortable without complaints, pain controlled. Pt requesting MRI brain to evaluate for brain bleed "as baseline" because she had hx of ICH 3 years prior  Denies CP, SOB, N/V, numbness/tingling     Vital Signs Last 24 Hrs  T(C): 36.6 (10-22-20 @ 05:35), Max: 36.6 (10-22-20 @ 05:35)  T(F): 97.9 (10-22-20 @ 05:35), Max: 97.9 (10-22-20 @ 05:35)  HR: 89 (10-22-20 @ 05:35) (89 - 89)  BP: 129/69 (10-22-20 @ 05:35) (129/69 - 129/69)  BP(mean): --  RR: 16 (10-22-20 @ 05:35) (16 - 16)  SpO2: 95% (10-22-20 @ 05:35) (95% - 95%)  AVSS    Physical Exam  General: AAOx3, NAD  RLE: moderate ecchymosis over anterior knee, skin intact, mild swelling and effusion noted  AROM/PROM 140 F, 0 Ext at knee without difficulty  No TTP over patella or joint line  NVI distally    LLE: moderate ecchymosis over anterior knee, skin intact, moderate/large effusion noted  Unable to flex/extend knee passively/actively 2/2 pain; difficult to assess integrity of extensor mechanism  Severe TTP over patella, diffusely  NVI distally                   A/P: 79yFemale w/L patella fx  - Stable  - Pain Control, f/u PM recs  - DVT ppx: As per primary team  - PT, WBS: WBAT in [place]  - F/u with Dr. Clark in office next week    Ortho Pager [phone]
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
## 2020-10-22 NOTE — DISCHARGE NOTE PROVIDER - CARE PROVIDERS DIRECT ADDRESSES
[email or web address] ,DirectAddress_Unknown

## 2020-10-22 NOTE — PROGRESS NOTE ADULT - SUBJECTIVE AND OBJECTIVE BOX
Patient is a 79y old  Female who presents with a chief complaint of Left Patella Fracture (22 Oct 2020 11:54)      INTERVAL HPI/OVERNIGHT EVENTS:    Pt. seen and examined this morning  Pt. reports L knee pain improved, controlled w/ rx  Denies CP, SOB    Review of Systems: 12 point review of systems otherwise negative    MEDICATIONS  (STANDING):  acetaminophen   Tablet .. 650 milliGRAM(s) Oral every 8 hours  acyclovir   Oral Tab/Cap 400 milliGRAM(s) Oral two times a day  atorvastatin 10 milliGRAM(s) Oral at bedtime  buPROPion XL . 150 milliGRAM(s) Oral daily  cholecalciferol 3000 Unit(s) Oral daily  metFORMIN 500 milliGRAM(s) Oral daily  polyethylene glycol 3350 17 Gram(s) Oral daily  senna 2 Tablet(s) Oral at bedtime  spironolactone 25 milliGRAM(s) Oral daily    MEDICATIONS  (PRN):  cyclobenzaprine 5 milliGRAM(s) Oral three times a day PRN Muscle Spasm  HYDROmorphone  Injectable 0.25 milliGRAM(s) IV Push every 4 hours PRN Moderate Pain (4 - 6)  ondansetron Injectable 4 milliGRAM(s) IV Push every 8 hours PRN Nausea and/or Vomiting  oxyCODONE    IR 2.5 milliGRAM(s) Oral every 4 hours PRN Severe Pain (7 - 10)      Allergies    No Known Allergies    Intolerances          Vital Signs Last 24 Hrs  T(C): 37.1 (22 Oct 2020 18:39), Max: 37.3 (22 Oct 2020 16:04)  T(F): 98.7 (22 Oct 2020 18:39), Max: 99.1 (22 Oct 2020 16:04)  HR: 88 (22 Oct 2020 18:39) (88 - 110)  BP: 122/69 (22 Oct 2020 18:39) (104/- - 142/66)  BP(mean): --  RR: 18 (22 Oct 2020 18:39) (16 - 20)  SpO2: 94% (22 Oct 2020 18:39) (93% - 95%)  CAPILLARY BLOOD GLUCOSE            Physical Exam:  (this morning)  Daily     Daily   General: comfortable-appearing in NAD  HEENT: MMM  Extremities: R knee ecchymoses, L knee in immobilizer   Skin:  WWP  Neuro:  AAOx3    LABS:                        12.6   23.61 )-----------( 210      ( 22 Oct 2020 11:04 )             37.4     10-22    134<L>  |  100  |  20  ----------------------------<  152<H>  3.8   |  21<L>  |  0.62    Ca    9.0      22 Oct 2020 11:04  Phos  2.2     10-22  Mg     1.9     10-22    TPro  7.5  /  Alb  3.9  /  TBili  0.5  /  DBili  x   /  AST  23  /  ALT  22  /  AlkPhos  57  10-21    PT/INR - ( 21 Oct 2020 03:14 )   PT: 12.8 sec;   INR: 1.09          PTT - ( 21 Oct 2020 03:14 )  PTT:30.5 sec  Urinalysis Basic - ( 21 Oct 2020 05:29 )    Color: Yellow / Appearance: SL CLOUDY / SG: >=1.030 / pH: x  Gluc: x / Ketone: >=80 mg/dL  / Bili: Small / Urobili: 0.2 E.U./dL   Blood: x / Protein: NEGATIVE mg/dL / Nitrite: NEGATIVE   Leuk Esterase: Moderate / RBC: > 10 /HPF / WBC Many /HPF   Sq Epi: x / Non Sq Epi: 0-5 /HPF / Bacteria: Present /HPF          RADIOLOGY & ADDITIONAL TESTS:    ---------------------------------------------------------------------------  I personally reviewed: [  ]EKG   [  ]CXR    [  ] CT    [  ]Other  ---------------------------------------------------------------------------  PLEASE CHECK WHEN PRESENT:     [  ]Heart Failure     [  ] Acute     [  ] Acute on Chronic     [  ] Chronic  -------------------------------------------------------------------     [  ]Diastolic [HFpEF]     [  ]Systolic [HFrEF]     [  ]Combined [HFpEF & HFrEF]     [  ]Other:  -------------------------------------------------------------------  [  ]RODDY     [  ]ATN     [  ]Reneal Medullary Necrosis     [  ]Renal Cortical Necrosis     [  ]Other Pathological Lesions:    [  ]CKD 1  [  ]CKD 2  [  ]CKD 3  [  ]CKD 4  [  ]CKD 5  [  ]Other  -------------------------------------------------------------------  [  ]Other/Unspecified:    --------------------------------------------------------------------    Abdominal Nutritional Status  [  ]Malnutrition: See Nutrition Note  [  ]Cachexia  [  ]Other:   [  ]Supplement Ordered:  [  ]Morbid Obesity (BMI >=40]

## 2020-10-22 NOTE — DISCHARGE NOTE PROVIDER - CARE PROVIDER_API CALL
Andrea Clark)  Orthopaedic Surgery Surgery  [address] [place] [address]  Phone: [phone]  Fax: [phone]  Follow Up Time: 1 week   [facility]  ORTHOPAEDIC SURGERY  [address], [address] [place] [address]  Phone: [phone]  Fax: [phone]  Scheduled Appointment: 10/29/2020 01:00 PM

## 2020-10-23 DIAGNOSIS — K59.00 CONSTIPATION, UNSPECIFIED: ICD-10-CM

## 2020-10-23 DIAGNOSIS — J98.11 ATELECTASIS: ICD-10-CM

## 2020-10-23 DIAGNOSIS — R07.89 OTHER CHEST PAIN: ICD-10-CM

## 2020-10-23 DIAGNOSIS — D72.828 OTHER ELEVATED WHITE BLOOD CELL COUNT: ICD-10-CM

## 2020-10-23 LAB
ANION GAP SERPL CALC-SCNC: 10 MMOL/L — SIGNIFICANT CHANGE UP (ref 5–17)
BUN SERPL-MCNC: 21 MG/DL — SIGNIFICANT CHANGE UP (ref 7–23)
CALCIUM SERPL-MCNC: 8.8 MG/DL — SIGNIFICANT CHANGE UP (ref 8.4–10.5)
CHLORIDE SERPL-SCNC: 100 MMOL/L — SIGNIFICANT CHANGE UP (ref 96–108)
CO2 SERPL-SCNC: 24 MMOL/L — SIGNIFICANT CHANGE UP (ref 22–31)
CREAT SERPL-MCNC: 0.52 MG/DL — SIGNIFICANT CHANGE UP (ref 0.5–1.3)
GLUCOSE SERPL-MCNC: 126 MG/DL — HIGH (ref 70–99)
HCT VFR BLD CALC: 37.3 % — SIGNIFICANT CHANGE UP (ref 34.5–45)
HGB BLD-MCNC: 12.8 G/DL — SIGNIFICANT CHANGE UP (ref 11.5–15.5)
MCHC RBC-ENTMCNC: 32 PG — SIGNIFICANT CHANGE UP (ref 27–34)
MCHC RBC-ENTMCNC: 34.3 GM/DL — SIGNIFICANT CHANGE UP (ref 32–36)
MCV RBC AUTO: 93.3 FL — SIGNIFICANT CHANGE UP (ref 80–100)
NRBC # BLD: 0 /100 WBCS — SIGNIFICANT CHANGE UP (ref 0–0)
NT-PROBNP SERPL-SCNC: 503 PG/ML — HIGH (ref 0–300)
PLATELET # BLD AUTO: 213 K/UL — SIGNIFICANT CHANGE UP (ref 150–400)
POTASSIUM SERPL-MCNC: 3.9 MMOL/L — SIGNIFICANT CHANGE UP (ref 3.5–5.3)
POTASSIUM SERPL-SCNC: 3.9 MMOL/L — SIGNIFICANT CHANGE UP (ref 3.5–5.3)
RBC # BLD: 4 M/UL — SIGNIFICANT CHANGE UP (ref 3.8–5.2)
RBC # FLD: 14.6 % — HIGH (ref 10.3–14.5)
SODIUM SERPL-SCNC: 134 MMOL/L — LOW (ref 135–145)
TROPONIN T SERPL-MCNC: <0.01 NG/ML — SIGNIFICANT CHANGE UP (ref 0–0.01)
TROPONIN T SERPL-MCNC: <0.01 NG/ML — SIGNIFICANT CHANGE UP (ref 0–0.01)
WBC # BLD: 18.1 K/UL — HIGH (ref 3.8–10.5)
WBC # FLD AUTO: 18.1 K/UL — HIGH (ref 3.8–10.5)

## 2020-10-23 PROCEDURE — 71045 X-RAY EXAM CHEST 1 VIEW: CPT | Mod: 26

## 2020-10-23 PROCEDURE — 99233 SBSQ HOSP IP/OBS HIGH 50: CPT | Mod: GC

## 2020-10-23 PROCEDURE — 93970 EXTREMITY STUDY: CPT | Mod: 26

## 2020-10-23 RX ORDER — METFORMIN HYDROCHLORIDE 850 MG/1
500 TABLET ORAL DAILY
Refills: 0 | Status: DISCONTINUED | OUTPATIENT
Start: 2020-10-24 | End: 2020-10-24

## 2020-10-23 RX ADMIN — ATORVASTATIN CALCIUM 10 MILLIGRAM(S): 80 TABLET, FILM COATED ORAL at 21:32

## 2020-10-23 RX ADMIN — Medication 650 MILLIGRAM(S): at 07:30

## 2020-10-23 RX ADMIN — POLYETHYLENE GLYCOL 3350 17 GRAM(S): 17 POWDER, FOR SOLUTION ORAL at 12:29

## 2020-10-23 RX ADMIN — Medication 650 MILLIGRAM(S): at 13:16

## 2020-10-23 RX ADMIN — HYDROMORPHONE HYDROCHLORIDE 0.25 MILLIGRAM(S): 2 INJECTION INTRAMUSCULAR; INTRAVENOUS; SUBCUTANEOUS at 21:45

## 2020-10-23 RX ADMIN — OXYCODONE HYDROCHLORIDE 2.5 MILLIGRAM(S): 5 TABLET ORAL at 05:30

## 2020-10-23 RX ADMIN — Medication 650 MILLIGRAM(S): at 06:36

## 2020-10-23 RX ADMIN — SPIRONOLACTONE 25 MILLIGRAM(S): 25 TABLET, FILM COATED ORAL at 06:36

## 2020-10-23 RX ADMIN — Medication 400 MILLIGRAM(S): at 06:36

## 2020-10-23 RX ADMIN — Medication 3000 UNIT(S): at 12:29

## 2020-10-23 RX ADMIN — HYDROMORPHONE HYDROCHLORIDE 0.25 MILLIGRAM(S): 2 INJECTION INTRAMUSCULAR; INTRAVENOUS; SUBCUTANEOUS at 21:32

## 2020-10-23 RX ADMIN — METFORMIN HYDROCHLORIDE 500 MILLIGRAM(S): 850 TABLET ORAL at 12:29

## 2020-10-23 RX ADMIN — Medication 400 MILLIGRAM(S): at 18:48

## 2020-10-23 RX ADMIN — BUPROPION HYDROCHLORIDE 150 MILLIGRAM(S): 150 TABLET, EXTENDED RELEASE ORAL at 13:16

## 2020-10-23 RX ADMIN — ONDANSETRON 4 MILLIGRAM(S): 8 TABLET, FILM COATED ORAL at 04:42

## 2020-10-23 RX ADMIN — SENNA PLUS 2 TABLET(S): 8.6 TABLET ORAL at 21:32

## 2020-10-23 RX ADMIN — OXYCODONE HYDROCHLORIDE 2.5 MILLIGRAM(S): 5 TABLET ORAL at 04:32

## 2020-10-23 NOTE — PROGRESS NOTE ADULT - SUBJECTIVE AND OBJECTIVE BOX
Subjective: No acute events overnight. Resting in bed comfortably.     Interval HPI: Patient complaining of chest pain, worse with palpation of the left chest wall. EKG done yesterday evening shows no acute ischemic changes, with some PAC's. Tronopin drawn were negative. This morning she continues to have intermittent, waxing and waning chest pain to the left chest wall and breast. The pain is reproducible with palpation of the chest wall. She reports some pain with inspiration, although it is notably worsened by palpation. She reports her leg pain has been somewhat well controlled. She denies leg pain at rest, and reports it is only present with attempted movement and palpation. She reports mild headaches, intermittent, no worse than headaches she has experienced for many years. She denies fevers, chills. She reports that she has not had a BM since arriving in the hospital (3 days), and reports she normally has a BM daily. She has had to use Miralax and/or Senna in the past for BM's. She denies any dysuria, urinary urgency, urinary frequency. She reports generalized abdominal discomfort and cramping. She denies shortness of breath at this time, although did say that at one point last night she felt mildly short of breath, although this resolved after a brief time. She reports her appetite has been decreased although she has been eating small meals.     VITAL SIGNS:  Vital Signs Last 24 Hrs  T(C): 36.5 (23 Oct 2020 04:53), Max: 37.3 (22 Oct 2020 16:04)  T(F): 97.7 (23 Oct 2020 04:53), Max: 99.1 (22 Oct 2020 16:04)  HR: 76 (23 Oct 2020 04:53) (76 - 110)  BP: 107/72 (23 Oct 2020 04:53) (107/72 - 142/66)  BP(mean): --  RR: 19 (23 Oct 2020 04:53) (16 - 20)  SpO2: 94% (23 Oct 2020 04:53) (94% - 95%)    PHYSICAL EXAM:    General: in NAD, lying comfortably in bed  HEENT: normocephalic, atraumatic; PERRL, anicteric sclera; MMM  Neck: supple, no JVD, trachea midline.   Cardiovascular: +S1/S2, bigeminal rhythm, tachycardic rate, no murmurs, gallops, rubs.   Respiratory: clear to auscultation B/L; no wheezing, no rales, no rhonchi  Gastrointestinal: soft, NT/ND; +BSx4, no organomegaly  Extremities: WWP, ecchymoses over R knee and R forearm. Mild swelling/effusion noted of R knee, skin intact, no rash. Swelling and ecchymoses noted to L knee and anterior tibia. Some L calf tenderness to palpation. 1+ pitting pretibial edema noted. Minimal edema noted to dorsum of L foot. No L thigh swelling noted.   Vascular: 2+ radial, DP/PT pulses B/L  Neurological: AAOx3; no focal deficits    MEDICATIONS:  MEDICATIONS  (STANDING):  acetaminophen   Tablet .. 650 milliGRAM(s) Oral every 8 hours  acyclovir   Oral Tab/Cap 400 milliGRAM(s) Oral two times a day  atorvastatin 10 milliGRAM(s) Oral at bedtime  buPROPion XL . 150 milliGRAM(s) Oral daily  cholecalciferol 3000 Unit(s) Oral daily  metFORMIN 500 milliGRAM(s) Oral daily  polyethylene glycol 3350 17 Gram(s) Oral daily  senna 2 Tablet(s) Oral at bedtime  spironolactone 25 milliGRAM(s) Oral daily    MEDICATIONS  (PRN):  cyclobenzaprine 5 milliGRAM(s) Oral three times a day PRN Muscle Spasm  HYDROmorphone  Injectable 0.25 milliGRAM(s) IV Push every 4 hours PRN Moderate Pain (4 - 6)  ondansetron Injectable 4 milliGRAM(s) IV Push every 8 hours PRN Nausea and/or Vomiting  oxyCODONE    IR 2.5 milliGRAM(s) Oral every 4 hours PRN Severe Pain (7 - 10)      ALLERGIES:  Allergies    No Known Allergies    Intolerances        LABS:                        12.6   23.61 )-----------( 210      ( 22 Oct 2020 11:04 )             37.4     10-22    134<L>  |  100  |  20  ----------------------------<  152<H>  3.8   |  21<L>  |  0.62    Ca    9.0      22 Oct 2020 11:04  Phos  2.2     10-22  Mg     1.9     10-22          CAPILLARY BLOOD GLUCOSE          RADIOLOGY & ADDITIONAL TESTS: Reviewed.

## 2020-10-23 NOTE — PROGRESS NOTE ADULT - SUBJECTIVE AND OBJECTIVE BOX
Ortho Progress Note    Pt comfortable without complaints, pain controlled. Pt requesting MRI brain to evaluate for brain bleed "as baseline" because she had hx of ICH 3 years prior  Denies CP, SOB, N/V, numbness/tingling     Vital Signs Last 24 Hrs  T(C): 36.5 (23 Oct 2020 04:53), Max: 37.3 (22 Oct 2020 16:04)  T(F): 97.7 (23 Oct 2020 04:53), Max: 99.1 (22 Oct 2020 16:04)  HR: 76 (23 Oct 2020 04:53) (76 - 110)  BP: 107/72 (23 Oct 2020 04:53) (107/72 - 142/66)  BP(mean): --  RR: 19 (23 Oct 2020 04:53) (16 - 20)  SpO2: 94% (23 Oct 2020 04:53) (94% - 95%)    Physical Exam  General: AAOx3, NAD  RLE: moderate ecchymosis over anterior knee, skin intact, mild swelling and effusion noted  AROM/PROM 140 F, 0 Ext at knee without difficulty  No TTP over patella or joint line  NVI distally    LLE: moderate ecchymosis over anterior knee, skin intact, moderate/large effusion noted  Unable to flex/extend knee passively/actively 2/2 pain; difficult to assess integrity of extensor mechanism  Severe TTP over patella, diffusely  NVI distally    A/P: 79yFemale w/L patella fx  - Stable  - Pain Control, f/u PM recs  - DVT ppx: As per primary team  - PT, WBS: WBAT in   - F/u with Dr. Clark in office next week    Ortho Pager [phone]

## 2020-10-23 NOTE — PROGRESS NOTE ADULT - SUBJECTIVE AND OBJECTIVE BOX
Patient is a 79y old  Female who presents with a chief complaint of Left Patella Fracture (23 Oct 2020 10:27)      INTERVAL HPI/OVERNIGHT EVENTS:    Pt. seen and examined earlier today  Pt. feels well, amenable to MEAGAN placement  c/o L knee pain controlled w/ rx (presently requesting Tylenol)  c/o constipation  c/o reproducible CP O/N, since resolved  Denies F/C, SOB, dysuria    Review of Systems: 12 point review of systems otherwise negative    MEDICATIONS  (STANDING):  acetaminophen   Tablet .. 650 milliGRAM(s) Oral every 8 hours  acyclovir   Oral Tab/Cap 400 milliGRAM(s) Oral two times a day  atorvastatin 10 milliGRAM(s) Oral at bedtime  buPROPion XL . 150 milliGRAM(s) Oral daily  cholecalciferol 3000 Unit(s) Oral daily  metFORMIN 500 milliGRAM(s) Oral daily  polyethylene glycol 3350 17 Gram(s) Oral daily  senna 2 Tablet(s) Oral at bedtime  spironolactone 25 milliGRAM(s) Oral daily    MEDICATIONS  (PRN):  cyclobenzaprine 5 milliGRAM(s) Oral three times a day PRN Muscle Spasm  HYDROmorphone  Injectable 0.25 milliGRAM(s) IV Push every 4 hours PRN Moderate Pain (4 - 6)  ondansetron Injectable 4 milliGRAM(s) IV Push every 8 hours PRN Nausea and/or Vomiting  oxyCODONE    IR 2.5 milliGRAM(s) Oral every 4 hours PRN Severe Pain (7 - 10)      Allergies    No Known Allergies    Intolerances          Vital Signs Last 24 Hrs  T(C): 36.6 (23 Oct 2020 14:53), Max: 37.1 (22 Oct 2020 18:39)  T(F): 97.9 (23 Oct 2020 14:53), Max: 98.7 (22 Oct 2020 18:39)  HR: 90 (23 Oct 2020 14:53) (76 - 91)  BP: 123/76 (23 Oct 2020 14:53) (107/72 - 123/76)  BP(mean): --  RR: 17 (23 Oct 2020 14:53) (16 - 19)  SpO2: 94% (23 Oct 2020 14:53) (94% - 95%)  CAPILLARY BLOOD GLUCOSE            Physical Exam:  (earlier today)  Daily     Daily   General: comfortable-appearing in NAD  HEENT: MMM  CV: no JVD  Lungs: normal WOB on RA  Extremities: R knee ecchymoses, L knee in immobilizer   Skin:  WWP  Neuro:  AAOx3  rest of exam per housestaff    LABS:                        12.8   18.10 )-----------( 213      ( 23 Oct 2020 10:28 )             37.3     10-23    134<L>  |  100  |  21  ----------------------------<  126<H>  3.9   |  24  |  0.52    Ca    8.8      23 Oct 2020 10:28  Phos  2.2     10-22  Mg     1.9     10-22              RADIOLOGY & ADDITIONAL TESTS:    ---------------------------------------------------------------------------  I personally reviewed: [  ]EKG   [  ]CXR    [  ] CT    [  ]Other  ---------------------------------------------------------------------------  PLEASE CHECK WHEN PRESENT:     [  ]Heart Failure     [  ] Acute     [  ] Acute on Chronic     [  ] Chronic  -------------------------------------------------------------------     [  ]Diastolic [HFpEF]     [  ]Systolic [HFrEF]     [  ]Combined [HFpEF & HFrEF]     [  ]Other:  -------------------------------------------------------------------  [  ]RODDY     [  ]ATN     [  ]Reneal Medullary Necrosis     [  ]Renal Cortical Necrosis     [  ]Other Pathological Lesions:    [  ]CKD 1  [  ]CKD 2  [  ]CKD 3  [  ]CKD 4  [  ]CKD 5  [  ]Other  -------------------------------------------------------------------  [  ]Other/Unspecified:    --------------------------------------------------------------------    Abdominal Nutritional Status  [  ]Malnutrition: See Nutrition Note  [  ]Cachexia  [  ]Other:   [  ]Supplement Ordered:  [  ]Morbid Obesity (BMI >=40]

## 2020-10-23 NOTE — PROGRESS NOTE ADULT - PROBLEM SELECTOR PLAN 10
cont. bowel regimen likely reactive d/t fracture; # down-trending; UA+ but asymptomatic; monitor CBC off abx

## 2020-10-23 NOTE — PROGRESS NOTE ADULT - PROBLEM SELECTOR PLAN 2
resolved; no suspicion for ACS, EKG non-ischemic, cardiac enzymes negative x2; likely costochondritis by hx, possibly d/t fall prior to admission; low suspicion for PE, checking Dopplers (if positive, will need Vascular consult, d/t ICH)

## 2020-10-23 NOTE — PROGRESS NOTE ADULT - ASSESSMENT
per Internal Medicine    78 yo F w/ a PMHx of HLD, seasonal affective disorder, ICH, pre-DM, and on spironolactone for hair growth, p/w left knee pain after a mechanical fall. Acute nondisplaced transverse fracture of left patella on CT knee. Admitted to [facility] for further management of care.     Problem/Plan - 1: Closed nondisplaced transverse fracture of left patella, initial encounter  -s/p mechanical fall.   -Ortho onboard, f/u recs  -PT consulted, f/u recs  -Pain management consulted, f/u recs    Problem/Plan - 2: Pain managment  -Tylenol 650mg PO q8h standing  -Oxycodone IR 2.5mg PO q4h PRN for moderate pain  -Dilaudid 0.25mg IV q4h PRN for severe breakthrough pain  -Flexeril IR 5mg PO TID PRN for muscle spasms in left knee  -Ice/Elevation.      Problem/Plan - 3: History of Intracranial hemorrhage  - Pt reports ICH 3-4 years ago  - Pt's PCP contacted as per patient request, MRI obtained   - Pharmacologic DVT prophylaxis indicated, but pt refusing at this time because of PMHx of ICH  - cont     Problem/Plan - 3: Hyperlipidemia: home pravastatin 40mg qd at bedtime.   -atorvastatin 10mg qd at bedtime (therapeutic interchange).      Problem/Plan - 4: Seasonal affective disorder  -c/w home Wellbutrin XL 150mg PO qd.      Problem/Plan - 5: Pre-diabetes.  Plan: Pt reports last HA1C 5.7%. On home metformin 500mg qd  -c/w metformin 500mg qd.      Problem/Plan - 6: Hair loss  -c/w home spironolactone 25mg qd.     Problem/Plan - 7: Vitamin D deficiency  -c/w home Vit D3 3000 units qd.      Problem/Plan - 8: Oral herpes simplex, not currently active  -c/w home acyclovir 150mg qd.      Problem/Plan - 9: UTI (urinary tract infection): asymptomatic  -UA positive for UTI. Pt denies suprapubic tenderness, dysuria, urinary frequency so will not treat     Problem/Plan - 10: constipation  -Miralax 17g qd and Senna 2 tabs at bedtime for bowel regimen     Problem/Plan - 11:  Problem: Nutrition, metabolism, and development symptoms. Plan; F: tolerating PO, no IVF  E: replete K<4, Mg<2  N: Regular, Kosher    VTE Prophylaxis: Lovenox 40mg q24h indicated, but holding due to patient refusing pharmacologic DVT prophylaxis because of PMHx of ICH.   GI: not needed  C: Full Code  D: RMF.
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
## 2020-10-24 VITALS
RESPIRATION RATE: 17 BRPM | DIASTOLIC BLOOD PRESSURE: 63 MMHG | SYSTOLIC BLOOD PRESSURE: 111 MMHG | OXYGEN SATURATION: 95 % | TEMPERATURE: 98 F | HEART RATE: 86 BPM

## 2020-10-24 LAB
ANION GAP SERPL CALC-SCNC: 11 MMOL/L — SIGNIFICANT CHANGE UP (ref 5–17)
BASOPHILS # BLD AUTO: 0.04 K/UL — SIGNIFICANT CHANGE UP (ref 0–0.2)
BASOPHILS NFR BLD AUTO: 0.3 % — SIGNIFICANT CHANGE UP (ref 0–2)
BUN SERPL-MCNC: 15 MG/DL — SIGNIFICANT CHANGE UP (ref 7–23)
CALCIUM SERPL-MCNC: 8.7 MG/DL — SIGNIFICANT CHANGE UP (ref 8.4–10.5)
CHLORIDE SERPL-SCNC: 101 MMOL/L — SIGNIFICANT CHANGE UP (ref 96–108)
CO2 SERPL-SCNC: 24 MMOL/L — SIGNIFICANT CHANGE UP (ref 22–31)
CREAT SERPL-MCNC: 0.47 MG/DL — LOW (ref 0.5–1.3)
EOSINOPHIL # BLD AUTO: 0.1 K/UL — SIGNIFICANT CHANGE UP (ref 0–0.5)
EOSINOPHIL NFR BLD AUTO: 0.8 % — SIGNIFICANT CHANGE UP (ref 0–6)
GLUCOSE SERPL-MCNC: 105 MG/DL — HIGH (ref 70–99)
HCT VFR BLD CALC: 37 % — SIGNIFICANT CHANGE UP (ref 34.5–45)
HGB BLD-MCNC: 12.3 G/DL — SIGNIFICANT CHANGE UP (ref 11.5–15.5)
IMM GRANULOCYTES NFR BLD AUTO: 0.4 % — SIGNIFICANT CHANGE UP (ref 0–1.5)
LYMPHOCYTES # BLD AUTO: 17.5 % — SIGNIFICANT CHANGE UP (ref 13–44)
LYMPHOCYTES # BLD AUTO: 2.15 K/UL — SIGNIFICANT CHANGE UP (ref 1–3.3)
MCHC RBC-ENTMCNC: 32.1 PG — SIGNIFICANT CHANGE UP (ref 27–34)
MCHC RBC-ENTMCNC: 33.2 GM/DL — SIGNIFICANT CHANGE UP (ref 32–36)
MCV RBC AUTO: 96.6 FL — SIGNIFICANT CHANGE UP (ref 80–100)
MONOCYTES # BLD AUTO: 1.05 K/UL — HIGH (ref 0–0.9)
MONOCYTES NFR BLD AUTO: 8.6 % — SIGNIFICANT CHANGE UP (ref 2–14)
NEUTROPHILS # BLD AUTO: 8.88 K/UL — HIGH (ref 1.8–7.4)
NEUTROPHILS NFR BLD AUTO: 72.4 % — SIGNIFICANT CHANGE UP (ref 43–77)
NRBC # BLD: 0 /100 WBCS — SIGNIFICANT CHANGE UP (ref 0–0)
PLATELET # BLD AUTO: 239 K/UL — SIGNIFICANT CHANGE UP (ref 150–400)
POTASSIUM SERPL-MCNC: 3.8 MMOL/L — SIGNIFICANT CHANGE UP (ref 3.5–5.3)
POTASSIUM SERPL-SCNC: 3.8 MMOL/L — SIGNIFICANT CHANGE UP (ref 3.5–5.3)
RBC # BLD: 3.83 M/UL — SIGNIFICANT CHANGE UP (ref 3.8–5.2)
RBC # FLD: 14.8 % — HIGH (ref 10.3–14.5)
SODIUM SERPL-SCNC: 136 MMOL/L — SIGNIFICANT CHANGE UP (ref 135–145)
WBC # BLD: 12.27 K/UL — HIGH (ref 3.8–10.5)
WBC # FLD AUTO: 12.27 K/UL — HIGH (ref 3.8–10.5)

## 2020-10-24 PROCEDURE — 85025 COMPLETE CBC W/AUTO DIFF WBC: CPT

## 2020-10-24 PROCEDURE — 83880 ASSAY OF NATRIURETIC PEPTIDE: CPT

## 2020-10-24 PROCEDURE — 83605 ASSAY OF LACTIC ACID: CPT

## 2020-10-24 PROCEDURE — 84100 ASSAY OF PHOSPHORUS: CPT

## 2020-10-24 PROCEDURE — 84484 ASSAY OF TROPONIN QUANT: CPT

## 2020-10-24 PROCEDURE — 81001 URINALYSIS AUTO W/SCOPE: CPT

## 2020-10-24 PROCEDURE — 93970 EXTREMITY STUDY: CPT

## 2020-10-24 PROCEDURE — 96376 TX/PRO/DX INJ SAME DRUG ADON: CPT

## 2020-10-24 PROCEDURE — 36415 COLL VENOUS BLD VENIPUNCTURE: CPT

## 2020-10-24 PROCEDURE — 87086 URINE CULTURE/COLONY COUNT: CPT

## 2020-10-24 PROCEDURE — 73700 CT LOWER EXTREMITY W/O DYE: CPT

## 2020-10-24 PROCEDURE — 80053 COMPREHEN METABOLIC PANEL: CPT

## 2020-10-24 PROCEDURE — 73564 X-RAY EXAM KNEE 4 OR MORE: CPT

## 2020-10-24 PROCEDURE — 85730 THROMBOPLASTIN TIME PARTIAL: CPT

## 2020-10-24 PROCEDURE — 83735 ASSAY OF MAGNESIUM: CPT

## 2020-10-24 PROCEDURE — 80048 BASIC METABOLIC PNL TOTAL CA: CPT

## 2020-10-24 PROCEDURE — 96374 THER/PROPH/DIAG INJ IV PUSH: CPT

## 2020-10-24 PROCEDURE — 87040 BLOOD CULTURE FOR BACTERIA: CPT

## 2020-10-24 PROCEDURE — 85610 PROTHROMBIN TIME: CPT

## 2020-10-24 PROCEDURE — 86140 C-REACTIVE PROTEIN: CPT

## 2020-10-24 PROCEDURE — 99285 EMERGENCY DEPT VISIT HI MDM: CPT | Mod: 25

## 2020-10-24 PROCEDURE — 85027 COMPLETE CBC AUTOMATED: CPT

## 2020-10-24 PROCEDURE — 87635 SARS-COV-2 COVID-19 AMP PRB: CPT

## 2020-10-24 PROCEDURE — 97161 PT EVAL LOW COMPLEX 20 MIN: CPT

## 2020-10-24 PROCEDURE — 99239 HOSP IP/OBS DSCHRG MGMT >30: CPT

## 2020-10-24 PROCEDURE — 71045 X-RAY EXAM CHEST 1 VIEW: CPT

## 2020-10-24 RX ADMIN — METFORMIN HYDROCHLORIDE 500 MILLIGRAM(S): 850 TABLET ORAL at 11:45

## 2020-10-24 RX ADMIN — Medication 650 MILLIGRAM(S): at 09:35

## 2020-10-24 RX ADMIN — Medication 3000 UNIT(S): at 11:45

## 2020-10-24 RX ADMIN — BUPROPION HYDROCHLORIDE 150 MILLIGRAM(S): 150 TABLET, EXTENDED RELEASE ORAL at 11:45

## 2020-10-24 RX ADMIN — Medication 650 MILLIGRAM(S): at 02:30

## 2020-10-24 RX ADMIN — SPIRONOLACTONE 25 MILLIGRAM(S): 25 TABLET, FILM COATED ORAL at 06:33

## 2020-10-24 RX ADMIN — Medication 650 MILLIGRAM(S): at 08:35

## 2020-10-24 RX ADMIN — Medication 650 MILLIGRAM(S): at 01:38

## 2020-10-24 RX ADMIN — Medication 400 MILLIGRAM(S): at 06:33

## 2020-10-24 NOTE — DISCHARGE NOTE NURSING/CASE MANAGEMENT/SOCIAL WORK - PATIENT PORTAL LINK FT
You can access the FollowMyHealth Patient Portal offered by [facility] by registering at the following website: [email or web address]. By joining [email or web address] FollowMyHealth portal, you will also be able to view your health information using other applications (apps) compatible with our system.

## 2020-10-24 NOTE — PROGRESS NOTE ADULT - REASON FOR ADMISSION
Left Patella Fracture

## 2020-10-24 NOTE — PROGRESS NOTE ADULT - SUBJECTIVE AND OBJECTIVE BOX
Patient was seen and examined by me at bedside. I agree with resident's note, subjective, objective physical exam, assessment and plan with following modifications/additions.    Greater than 35 minutes spent on total encounter; more than 50% of the visit was spent counseling and/or coordinating care by the attending physician.    Venous dopplers neg, patient wanting to go to [facility] to improve mobility, accepted and discharge to [facility] today    Christian Brooks MD [phone]

## 2020-10-24 NOTE — DISCHARGE NOTE NURSING/CASE MANAGEMENT/SOCIAL WORK - NSDCFUADDAPPT_GEN_ALL_CORE_FT
Please follow up with your Orthopaedic Surgery Provider, Dr. Andrea Clark at [address], [address] Floor, [place] [address]. on 10/29/2020 at 1:00pm.    Please call Dr. Clark prior to appointment confirm your primary insurance.    Appointment was scheduled by Ms. YEIMI Mcmullen, Referral Coordinator.

## 2020-10-26 LAB
CULTURE RESULTS: SIGNIFICANT CHANGE UP
CULTURE RESULTS: SIGNIFICANT CHANGE UP
SPECIMEN SOURCE: SIGNIFICANT CHANGE UP
SPECIMEN SOURCE: SIGNIFICANT CHANGE UP

## 2020-10-28 DIAGNOSIS — R07.89 OTHER CHEST PAIN: ICD-10-CM
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
## 2020-10-28 DIAGNOSIS — R73.03 PREDIABETES: ICD-10-CM

## 2020-10-28 DIAGNOSIS — E55.9 VITAMIN D DEFICIENCY, UNSPECIFIED: ICD-10-CM

## 2020-10-28 DIAGNOSIS — Z86.19 PERSONAL HISTORY OF OTHER INFECTIOUS AND PARASITIC DISEASES: ICD-10-CM

## 2020-10-28 DIAGNOSIS — L65.9 NONSCARRING HAIR LOSS, UNSPECIFIED: ICD-10-CM

## 2020-10-28 DIAGNOSIS — Z85.821 PERSONAL HISTORY OF MERKEL CELL CARCINOMA: ICD-10-CM

## 2020-10-28 DIAGNOSIS — E78.5 HYPERLIPIDEMIA, UNSPECIFIED: ICD-10-CM

## 2020-10-28 DIAGNOSIS — S82.045A NONDISPLACED COMMINUTED FRACTURE OF LEFT PATELLA, INITIAL ENCOUNTER FOR CLOSED FRACTURE: ICD-10-CM

## 2020-10-28 DIAGNOSIS — I69.228: ICD-10-CM
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
## 2020-10-28 DIAGNOSIS — Z79.84 LONG TERM (CURRENT) USE OF ORAL HYPOGLYCEMIC DRUGS: ICD-10-CM

## 2020-10-28 DIAGNOSIS — K59.00 CONSTIPATION, UNSPECIFIED: ICD-10-CM

## 2020-10-28 DIAGNOSIS — F33.9 MAJOR DEPRESSIVE DISORDER, RECURRENT, UNSPECIFIED: ICD-10-CM

## 2020-12-11 ENCOUNTER — EMERGENCY (EMERGENCY)
Facility: HOSPITAL | Age: 80
LOS: 1 days | Discharge: HOME | End: 2020-12-11
Admitting: EMERGENCY MEDICINE
Payer: MEDICARE

## 2020-12-11 ENCOUNTER — OUTPATIENT (OUTPATIENT)
Dept: OUTPATIENT SERVICES | Facility: HOSPITAL | Age: 80
LOS: 1 days | End: 2020-12-11
Payer: MEDICARE

## 2020-12-11 VITALS
TEMPERATURE: 99 F | SYSTOLIC BLOOD PRESSURE: 132 MMHG | OXYGEN SATURATION: 98 % | RESPIRATION RATE: 18 BRPM | DIASTOLIC BLOOD PRESSURE: 75 MMHG | HEART RATE: 67 BPM | HEIGHT: 60 IN

## 2020-12-11 DIAGNOSIS — Z90.49 ACQUIRED ABSENCE OF OTHER SPECIFIED PARTS OF DIGESTIVE TRACT: Chronic | ICD-10-CM

## 2020-12-11 DIAGNOSIS — Z20.828 CONTACT WITH AND (SUSPECTED) EXPOSURE TO OTHER VIRAL COMMUNICABLE DISEASES: ICD-10-CM

## 2020-12-11 DIAGNOSIS — Z98.890 OTHER SPECIFIED POSTPROCEDURAL STATES: Chronic | ICD-10-CM

## 2020-12-11 DIAGNOSIS — Z96.652 PRESENCE OF LEFT ARTIFICIAL KNEE JOINT: Chronic | ICD-10-CM

## 2020-12-11 PROBLEM — F33.8 OTHER RECURRENT DEPRESSIVE DISORDERS: Chronic | Status: ACTIVE | Noted: 2020-10-21

## 2020-12-11 PROBLEM — E78.5 HYPERLIPIDEMIA, UNSPECIFIED: Chronic | Status: ACTIVE | Noted: 2020-10-21

## 2020-12-11 LAB — SARS-COV-2 RNA SPEC QL NAA+PROBE: SIGNIFICANT CHANGE UP

## 2020-12-11 PROCEDURE — 73560 X-RAY EXAM OF KNEE 1 OR 2: CPT | Mod: 26,LT

## 2020-12-11 PROCEDURE — 99283 EMERGENCY DEPT VISIT LOW MDM: CPT

## 2020-12-11 PROCEDURE — 73560 X-RAY EXAM OF KNEE 1 OR 2: CPT

## 2020-12-11 PROCEDURE — U0003: CPT

## 2020-12-11 PROCEDURE — 99283 EMERGENCY DEPT VISIT LOW MDM: CPT | Mod: CS

## 2020-12-11 NOTE — ED PROVIDER NOTE - OBJECTIVE STATEMENT
Patient is presenting to the Emergency Department with a request to have COVID-19 testing.  Denies symptoms, including cough, fever, chills, bodyaches or sore throat. states that she has been having shortness of breath over the past 6 weeks after a broken bone in the left lower leg. following with Dr. Elizondo. no chest pain palpitations cough sob.

## 2020-12-11 NOTE — ED PROVIDER NOTE - NS ED ROS FT
CONSTITUTIONAL:  No fever or chills, no bodyaches  HEENT:  HEADACHE, No sore throat or no nasal congestion  PULM: SOB,  No cough  GI:  No diarrhea, no vomiting

## 2020-12-11 NOTE — ED PROVIDER NOTE - CLINICAL SUMMARY MEDICAL DECISION MAKING FREE TEXT BOX
Patient is presenting to the Emergency Department and requesting a COVID-19 test.  states new sob over the past 6 week sin the setting of a left lower leg fracture, following Dr. Elizondo. offered pt further evaluated in ed. discussed concern for sob (PE in the setting of new broken extremity). 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 pt politely declines and states she will be seen by pcp or ed if symptoms worsen. Patient  has an unremarkable focused exam and looks well.  Nasopharyngeal PCR has been obtained and patient has been guided on how to obtain their results.  General COVID-19 discharge instructions have been given to the patient. Patient is presenting to the Emergency Department and requesting a COVID-19 test.  states new sob over the past 6 week sin the setting of a left lower leg fracture, following Dr. Elizondo. offered pt further evaluated in ed. discussed concern for sob (PE in the setting of new broken extremity). 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 Patient  has an unremarkable focused exam and looks well.  Nasopharyngeal PCR has been obtained and patient has been guided on how to obtain their results.  General COVID-19 discharge instructions have been given to the patient.

## 2020-12-11 NOTE — ED PROVIDER NOTE - PATIENT PORTAL LINK FT
You can access the FollowMyHealth Patient Portal offered by [facility] by registering at the following website: [email or web address]. By joining [facility] FollowMyHealth portal, you will also be able to view your health information using other applications (apps) compatible with our system.

## 2021-05-12 ENCOUNTER — OUTPATIENT (OUTPATIENT)
Dept: OUTPATIENT SERVICES | Facility: HOSPITAL | Age: 81
LOS: 1 days | End: 2021-05-12
Payer: MEDICARE

## 2021-05-12 DIAGNOSIS — Z90.49 ACQUIRED ABSENCE OF OTHER SPECIFIED PARTS OF DIGESTIVE TRACT: Chronic | ICD-10-CM

## 2021-05-12 DIAGNOSIS — Z98.890 OTHER SPECIFIED POSTPROCEDURAL STATES: Chronic | ICD-10-CM

## 2021-05-12 DIAGNOSIS — Z96.652 PRESENCE OF LEFT ARTIFICIAL KNEE JOINT: Chronic | ICD-10-CM

## 2021-05-12 PROCEDURE — 71046 X-RAY EXAM CHEST 2 VIEWS: CPT

## 2021-05-12 PROCEDURE — 71046 X-RAY EXAM CHEST 2 VIEWS: CPT | Mod: 26

## 2021-07-12 NOTE — H&P ADULT - NSICDXPASTMEDICALHX_GEN_ALL_CORE_FT
PAST MEDICAL HISTORY:  Hyperlipidemia, unspecified hyperlipidemia type     Seasonal affective disorder      monthly or less

## 2022-02-01 NOTE — ED ADULT NURSE NOTE - NS ED NURSE REPORT GIVEN DT
How Severe Are Your Spot(S)?: moderate Hpi Title: Evaluation of Skin Lesions Additional History: Patient is present for a full body skin exam. 21-Oct-2020 07:10

## 2022-09-01 NOTE — ED PROVIDER NOTE - NSCAREINITIATED _GEN_ER
We are committed to providing our patients with their test results in a timely manner. If you have access to [employer], you will receive your results within 5 to 7 days. If your results are normal, a member of our office may call you or you may receive a letter in the mail within 10 to 15 days of your testing. If your results have something additional to discuss, a member of our office will contact you by phone. If at any time you have questions related your results, please feel free to call our office at [phone]  
-ACP Only-, .(Attending)

## 2025-03-06 NOTE — ED ADULT TRIAGE NOTE - INTERNATIONAL TRAVEL
What Type Of Note Output Would You Prefer (Optional)?: Standard Output Is The Patient Presenting As Previously Scheduled?: Yes How Severe Is Your Rash?: mild Is This A New Presentation, Or A Follow-Up?: Rash No

## 2025-05-22 NOTE — H&P ADULT - PROBLEM/PLAN-7
Pt laying supine with vision aid on, eyes closed, AAO x4. NGT to R nare, in place and connected to suction, canister empty. Pt states pain level to be 8/10 to stomach area, pt also states being very tired. Waiting to hear from general surgery about further care. IV line clean and intact. NADN. Pt denies being in any other pain or discomfort at this time. Bed in lowest position, personal items and call light within reach, instructed to call for help if needed.    [facility]  Nurses Note -- 4 Eyes        May 21, 2025        Skin assessed on: Q Shift Change        [x] No Pressure Injuries Present                 [x]Prevention Measures Documented     [] Yes LDA  for Pressure Injury Previously documented      [] Yes New Pressure Injury Discovered              [] LDA for New Pressure Injury Added        Attending RN:  Adelia Lau LPN      Second RN:  Verna RN           DISPLAY PLAN FREE TEXT

## 2025-06-14 NOTE — PROGRESS NOTE ADULT - PROBLEM/PLAN-10
Jack Cates was admitted to [facility] room 308 from ED via wheelchair accompanied by Other ED tech. Reason for hospitalization is Syncope. Upon arrival, patient is stable.  Patient oriented to bed, call light, , and room.  Patient provided with the following educational materials upon admission:safety, advanced directives, infection control, and pain.   Level of understanding patient verbalized understanding.   Admission orders received at this time. Dr. Reyes Chavez notified of patient arrival. See Epic documentation for patient individualized nursing care plan.   DISPLAY PLAN FREE TEXT